# Patient Record
Sex: FEMALE | Race: BLACK OR AFRICAN AMERICAN | NOT HISPANIC OR LATINO
[De-identification: names, ages, dates, MRNs, and addresses within clinical notes are randomized per-mention and may not be internally consistent; named-entity substitution may affect disease eponyms.]

---

## 2018-08-15 PROBLEM — Z00.00 ENCOUNTER FOR PREVENTIVE HEALTH EXAMINATION: Status: ACTIVE | Noted: 2018-08-15

## 2018-09-05 ENCOUNTER — FORM ENCOUNTER (OUTPATIENT)
Age: 48
End: 2018-09-05

## 2018-09-06 ENCOUNTER — APPOINTMENT (OUTPATIENT)
Dept: ORTHOPEDIC SURGERY | Facility: CLINIC | Age: 48
End: 2018-09-06
Payer: MEDICAID

## 2018-09-06 ENCOUNTER — OUTPATIENT (OUTPATIENT)
Dept: OUTPATIENT SERVICES | Facility: HOSPITAL | Age: 48
LOS: 1 days | End: 2018-09-06
Payer: COMMERCIAL

## 2018-09-06 VITALS — BODY MASS INDEX: 41.78 KG/M2 | HEIGHT: 66 IN | WEIGHT: 260 LBS

## 2018-09-06 DIAGNOSIS — Z86.79 PERSONAL HISTORY OF OTHER DISEASES OF THE CIRCULATORY SYSTEM: ICD-10-CM

## 2018-09-06 DIAGNOSIS — Z82.49 FAMILY HISTORY OF ISCHEMIC HEART DISEASE AND OTHER DISEASES OF THE CIRCULATORY SYSTEM: ICD-10-CM

## 2018-09-06 DIAGNOSIS — Z87.39 PERSONAL HISTORY OF OTHER DISEASES OF THE MUSCULOSKELETAL SYSTEM AND CONNECTIVE TISSUE: ICD-10-CM

## 2018-09-06 DIAGNOSIS — M16.0 BILATERAL PRIMARY OSTEOARTHRITIS OF HIP: ICD-10-CM

## 2018-09-06 DIAGNOSIS — Z86.59 PERSONAL HISTORY OF OTHER MENTAL AND BEHAVIORAL DISORDERS: ICD-10-CM

## 2018-09-06 PROCEDURE — 99204 OFFICE O/P NEW MOD 45 MIN: CPT

## 2018-09-06 PROCEDURE — 73521 X-RAY EXAM HIPS BI 2 VIEWS: CPT | Mod: 26

## 2018-09-06 PROCEDURE — 72020 X-RAY EXAM OF SPINE 1 VIEW: CPT | Mod: 26

## 2018-09-06 PROCEDURE — 73521 X-RAY EXAM HIPS BI 2 VIEWS: CPT

## 2018-09-06 PROCEDURE — 72020 X-RAY EXAM OF SPINE 1 VIEW: CPT

## 2018-09-06 RX ORDER — LOSARTAN POTASSIUM 100 MG/1
TABLET, FILM COATED ORAL
Refills: 0 | Status: ACTIVE | COMMUNITY

## 2018-09-06 RX ORDER — CYCLOBENZAPRINE HYDROCHLORIDE 7.5 MG/1
TABLET, FILM COATED ORAL
Refills: 0 | Status: ACTIVE | COMMUNITY

## 2018-09-06 RX ORDER — CHROMIUM 200 MCG
TABLET ORAL
Refills: 0 | Status: ACTIVE | COMMUNITY

## 2018-09-06 RX ORDER — PNV NO.95/FERROUS FUM/FOLIC AC 28MG-0.8MG
TABLET ORAL
Refills: 0 | Status: ACTIVE | COMMUNITY

## 2019-01-29 ENCOUNTER — APPOINTMENT (OUTPATIENT)
Dept: ORTHOPEDIC SURGERY | Facility: CLINIC | Age: 49
End: 2019-01-29
Payer: MEDICAID

## 2019-01-29 VITALS — HEART RATE: 75 BPM | OXYGEN SATURATION: 98 % | HEIGHT: 66 IN | WEIGHT: 243 LBS | BODY MASS INDEX: 39.05 KG/M2

## 2019-01-29 DIAGNOSIS — E66.01 MORBID (SEVERE) OBESITY DUE TO EXCESS CALORIES: ICD-10-CM

## 2019-01-29 DIAGNOSIS — F11.90 OPIOID USE, UNSPECIFIED, UNCOMPLICATED: ICD-10-CM

## 2019-01-29 PROCEDURE — 99214 OFFICE O/P EST MOD 30 MIN: CPT

## 2019-01-29 NOTE — PHYSICAL EXAM
[de-identified] : Constitutional: Well appearing. No acute distress.\par Mental Status: Alert & oriented to person, place and time. Normal affect.\par Pulmonary: No respiratory distress. Normal chest excursion.\par \par Gait: Coxalgic.\par Ambulatory assist devices: Cane.\par \par Cervical spine: Skin intact. No visible deformity. Painless active ROM without evident restriction.\par Bilateral upper extremities: Skin intact. No deformity. Painless active ROM without evident restriction.\par Thoracolumbar spine: No deformity. No tenderness. \par Pelvis: No pelvic obliquity. No tenderness.\par Leg lengths: Equal.\par \par \par Left Hip:\par Skin intact.\par No surgical scars.\par No erythema.\par No ecchymosis.\par No swelling.\par No deformity.\par No focal tenderness.\par Painless and unrestricted range of motion. ROM from full extension to 40 degrees of flexion. <5 degrees of internal rotation. <5 degrees of external rotation. 30 degrees of abduction. 30 degrees of adduction.\par No crepitation.\par No instability.\par FANNIE painless.\par +Impingement (FADIR).\par +Stinchfield\par Abductor power 3/5.\par Flexor power 2+/5.\par \par Bilateral Knees: Skin intact. No surgical scars. No erythema or ecchymosis. No swelling or effusion. No deformity. No focal tenderness. Painless and unrestricted range of motion. Central patellar tracking. No crepitation. No instability. Quadriceps power 5/5.\par \par Neurological: Intact distal crude touch sensation. Normal distal motor power. \par Cardiovascular: Palpable dorsalis pedis and posterior tibialis pulses. Brisk capillary refill. No peripheral edema.\par Lymphatics: No peripheral adenopathy appreciated. [de-identified] : X-ray imaging of the bilateral hips done in September 2018 reviewed, showing severe osteoarthritis of bilateral hips

## 2019-01-29 NOTE — ADDENDUM
[FreeTextEntry1] : This note was written by Winsome Nascimento on 01/29/2019 acting as scribe for Dr. Gleason and REYNALDO Ramirez.\par \par \par \par

## 2019-01-29 NOTE — DISCUSSION/SUMMARY
[de-identified] : Patient has progressively severe hip pain and disability secondary to DJD and has failed extensive conservative care including activity modification, analgesic medications and therapeutic exercise. The patient was indicated for left total hip replacemen\par t.\par We discussed the details of the procedure, the expected recovery period, and the expected outcome. Bearing surface and fixation options were discussed, along with surgical approaches. For this patient I recommended a posterior approach to ensure adequate surgical exposure.\par \par We discussed the likelihood of satisfaction after complete recovery, and the potential causes of dissatisfaction. Specific risks of total hip replacement were discussed in detail. We discussed the risk of surgical site complications including but not limited to: surgical site infection, wound healing complications, bone fracture, prosthetic hip dislocation, neurovascular injury, hemorrhage, limb length inequality, persistent pain and need for reoperation. We discussed surgical blood loss and the possible need for blood transfusion. We discussed the risk of perioperative medical complications, including but not limited to catheter-associated urinary tract infection, venous thromboembolism and other cardiopulmonary complications. We discussed anesthetic options and the risk of anesthesia-related complications. We discussed the durability of prosthetic hips and limitations related to wear, osteolysis and loosening. The patient was previously given a copy of my preoperative packet with additional information about the procedure which she has already read, I encouraged her to review it again.\par \par The patient gave informed consent for the surgical procedure and was instructed to speak to my surgical coordinator to arrange the logistics of surgical scheduling, presurgical testing, and medical optimization and clearance. \par \par I informed the patient that due to her weight, she will be taking on risk greater than the average risk by undergoing surgery. I advised the patient to engage in low impact exercise and to lose weight before surgery, but encouraged her to schedule surgery on a realistic timeline so her condition does not become more severe. I suggested she wait about 3 months while attempting to further improve her weight and cardiovascular health. Patient asked if she could do water aerobics and I informed her that she can. \par \par We also discussed that patient will want to undergo hip replacement surgery on the right hip as well but should decide on timing based on the rate of her recovery from left hip replacement surgery. Follow up with symptoms or health concerns prior to surgery.

## 2019-01-29 NOTE — HISTORY OF PRESENT ILLNESS
[de-identified] : 47 y/o F presents today for surgical consult of bilateral hip pain, pain in left > right. She reports marked pain in the bilateral hips that seriously limits her activity. The pain is exacerbated by activity and makes her unable to place shoes on either foot without assistance. Patient can walk indoors only and uses stairs with difficulty. She has a moderate to severe limp and uses a cane to ambulate most of the time. At her last visit, patient was told to lost weight and she reports that she has lost 20-25lbs. She had been taking Percocet and per my recommendation, patient now takes Tylenol #3 for pain. Patient denies PMHx of diabetes and smoking.

## 2019-03-03 ENCOUNTER — TRANSCRIPTION ENCOUNTER (OUTPATIENT)
Age: 49
End: 2019-03-03

## 2019-03-31 ENCOUNTER — FORM ENCOUNTER (OUTPATIENT)
Age: 49
End: 2019-03-31

## 2019-04-01 ENCOUNTER — OUTPATIENT (OUTPATIENT)
Dept: OUTPATIENT SERVICES | Facility: HOSPITAL | Age: 49
LOS: 1 days | End: 2019-04-01
Payer: COMMERCIAL

## 2019-04-01 ENCOUNTER — APPOINTMENT (OUTPATIENT)
Dept: CT IMAGING | Facility: HOSPITAL | Age: 49
End: 2019-04-01
Payer: MEDICAID

## 2019-04-01 DIAGNOSIS — Z01.818 ENCOUNTER FOR OTHER PREPROCEDURAL EXAMINATION: ICD-10-CM

## 2019-04-01 DIAGNOSIS — Z22.321 CARRIER OR SUSPECTED CARRIER OF METHICILLIN SUSCEPTIBLE STAPHYLOCOCCUS AUREUS: ICD-10-CM

## 2019-04-01 LAB
ANION GAP SERPL CALC-SCNC: 13 MMOL/L — SIGNIFICANT CHANGE UP (ref 5–17)
APPEARANCE UR: CLEAR — SIGNIFICANT CHANGE UP
APTT BLD: 37.3 SEC — HIGH (ref 27.5–36.3)
BACTERIA # UR AUTO: PRESENT /HPF
BILIRUB UR-MCNC: NEGATIVE — SIGNIFICANT CHANGE UP
BUN SERPL-MCNC: 18 MG/DL — SIGNIFICANT CHANGE UP (ref 7–23)
CALCIUM SERPL-MCNC: 9.7 MG/DL — SIGNIFICANT CHANGE UP (ref 8.4–10.5)
CHLORIDE SERPL-SCNC: 101 MMOL/L — SIGNIFICANT CHANGE UP (ref 96–108)
CO2 SERPL-SCNC: 27 MMOL/L — SIGNIFICANT CHANGE UP (ref 22–31)
COLOR SPEC: YELLOW — SIGNIFICANT CHANGE UP
CREAT SERPL-MCNC: 1.11 MG/DL — SIGNIFICANT CHANGE UP (ref 0.5–1.3)
DIFF PNL FLD: NEGATIVE — SIGNIFICANT CHANGE UP
EPI CELLS # UR: ABNORMAL /HPF (ref 0–5)
GLUCOSE SERPL-MCNC: 82 MG/DL — SIGNIFICANT CHANGE UP (ref 70–99)
GLUCOSE UR QL: NEGATIVE — SIGNIFICANT CHANGE UP
HBA1C BLD-MCNC: 4.8 % — SIGNIFICANT CHANGE UP (ref 4–5.6)
HCT VFR BLD CALC: 34.8 % — SIGNIFICANT CHANGE UP (ref 34.5–45)
HGB BLD-MCNC: 10.6 G/DL — LOW (ref 11.5–15.5)
INR BLD: 1.25 — HIGH (ref 0.88–1.16)
KETONES UR-MCNC: ABNORMAL MG/DL
LEUKOCYTE ESTERASE UR-ACNC: ABNORMAL
MCHC RBC-ENTMCNC: 30.5 GM/DL — LOW (ref 32–36)
MCHC RBC-ENTMCNC: 31.1 PG — SIGNIFICANT CHANGE UP (ref 27–34)
MCV RBC AUTO: 102.1 FL — HIGH (ref 80–100)
MRSA PCR RESULT.: NEGATIVE — SIGNIFICANT CHANGE UP
NITRITE UR-MCNC: NEGATIVE — SIGNIFICANT CHANGE UP
NRBC # BLD: 0 /100 WBCS — SIGNIFICANT CHANGE UP (ref 0–0)
PH UR: 5.5 — SIGNIFICANT CHANGE UP (ref 5–8)
PLATELET # BLD AUTO: 283 K/UL — SIGNIFICANT CHANGE UP (ref 150–400)
POTASSIUM SERPL-MCNC: 3.9 MMOL/L — SIGNIFICANT CHANGE UP (ref 3.5–5.3)
POTASSIUM SERPL-SCNC: 3.9 MMOL/L — SIGNIFICANT CHANGE UP (ref 3.5–5.3)
PROT UR-MCNC: NEGATIVE MG/DL — SIGNIFICANT CHANGE UP
PROTHROM AB SERPL-ACNC: 14.2 SEC — HIGH (ref 10–12.9)
RBC # BLD: 3.41 M/UL — LOW (ref 3.8–5.2)
RBC # FLD: 12.2 % — SIGNIFICANT CHANGE UP (ref 10.3–14.5)
RBC CASTS # UR COMP ASSIST: < 5 /HPF — SIGNIFICANT CHANGE UP
S AUREUS DNA NOSE QL NAA+PROBE: NEGATIVE — SIGNIFICANT CHANGE UP
SODIUM SERPL-SCNC: 141 MMOL/L — SIGNIFICANT CHANGE UP (ref 135–145)
SP GR SPEC: 1.02 — SIGNIFICANT CHANGE UP (ref 1–1.03)
UROBILINOGEN FLD QL: 0.2 E.U./DL — SIGNIFICANT CHANGE UP
WBC # BLD: 8.57 K/UL — SIGNIFICANT CHANGE UP (ref 3.8–10.5)
WBC # FLD AUTO: 8.57 K/UL — SIGNIFICANT CHANGE UP (ref 3.8–10.5)
WBC UR QL: ABNORMAL /HPF

## 2019-04-01 PROCEDURE — 93005 ELECTROCARDIOGRAM TRACING: CPT

## 2019-04-01 PROCEDURE — 87641 MR-STAPH DNA AMP PROBE: CPT

## 2019-04-01 PROCEDURE — 83036 HEMOGLOBIN GLYCOSYLATED A1C: CPT

## 2019-04-01 PROCEDURE — 85610 PROTHROMBIN TIME: CPT

## 2019-04-01 PROCEDURE — 93010 ELECTROCARDIOGRAM REPORT: CPT

## 2019-04-01 PROCEDURE — 73700 CT LOWER EXTREMITY W/O DYE: CPT | Mod: 26,LT

## 2019-04-01 PROCEDURE — 87184 SC STD DISK METHOD PER PLATE: CPT

## 2019-04-01 PROCEDURE — 73700 CT LOWER EXTREMITY W/O DYE: CPT

## 2019-04-01 PROCEDURE — 85730 THROMBOPLASTIN TIME PARTIAL: CPT

## 2019-04-01 PROCEDURE — 81001 URINALYSIS AUTO W/SCOPE: CPT

## 2019-04-01 PROCEDURE — 71046 X-RAY EXAM CHEST 2 VIEWS: CPT | Mod: 26

## 2019-04-01 PROCEDURE — 85027 COMPLETE CBC AUTOMATED: CPT

## 2019-04-01 PROCEDURE — 87086 URINE CULTURE/COLONY COUNT: CPT

## 2019-04-01 PROCEDURE — 80048 BASIC METABOLIC PNL TOTAL CA: CPT

## 2019-04-01 PROCEDURE — 71046 X-RAY EXAM CHEST 2 VIEWS: CPT

## 2019-04-04 LAB
-  AMPICILLIN: SIGNIFICANT CHANGE UP
-  CLINDAMYCIN: SIGNIFICANT CHANGE UP
-  ERYTHROMYCIN: SIGNIFICANT CHANGE UP
-  LEVOFLOXACIN: SIGNIFICANT CHANGE UP
-  PENICILLIN: SIGNIFICANT CHANGE UP
-  VANCOMYCIN: SIGNIFICANT CHANGE UP
CULTURE RESULTS: SIGNIFICANT CHANGE UP
METHOD TYPE: SIGNIFICANT CHANGE UP
ORGANISM # SPEC MICROSCOPIC CNT: SIGNIFICANT CHANGE UP
ORGANISM # SPEC MICROSCOPIC CNT: SIGNIFICANT CHANGE UP
SPECIMEN SOURCE: SIGNIFICANT CHANGE UP

## 2019-04-08 LAB
ANABASINE UR-MCNC: <2 NG/ML — SIGNIFICANT CHANGE UP
COTININE UR-MCNC: SIGNIFICANT CHANGE UP NG/ML
NICOTINE UR-MCNC: <5 NG/ML — SIGNIFICANT CHANGE UP
NORNICOTINE UR-MCNC: <2 NG/ML — SIGNIFICANT CHANGE UP

## 2019-04-24 ENCOUNTER — APPOINTMENT (OUTPATIENT)
Dept: INTERNAL MEDICINE | Facility: CLINIC | Age: 49
End: 2019-04-24
Payer: MEDICAID

## 2019-04-24 VITALS
DIASTOLIC BLOOD PRESSURE: 84 MMHG | OXYGEN SATURATION: 98 % | HEART RATE: 70 BPM | HEIGHT: 66 IN | BODY MASS INDEX: 39.05 KG/M2 | TEMPERATURE: 98.3 F | WEIGHT: 243 LBS | RESPIRATION RATE: 14 BRPM | SYSTOLIC BLOOD PRESSURE: 128 MMHG

## 2019-04-24 PROCEDURE — 99204 OFFICE O/P NEW MOD 45 MIN: CPT

## 2019-04-24 NOTE — PLAN
[FreeTextEntry1] : Stop Xarelto 3d prior to surgery- Rec starting 8hrs post surgery\par Advised patient not to mix NSAIDs (diclofenac) in general while on xarelto\par Anemia on iron, monitor h/h closely post op\par

## 2019-04-24 NOTE — HISTORY OF PRESENT ILLNESS
[Atrial Fibrillation] : atrial fibrillation [No Pertinent Pulmonary History] : no history of asthma, COPD, sleep apnea, or smoking [No Adverse Anesthesia Reaction] : no adverse anesthesia reaction in self or family member [Coronary Artery Disease] : no coronary artery disease [FreeTextEntry1] : Left THR [FreeTextEntry3] : Dr Gleason [FreeTextEntry4] : Lifelong issue with hips.  Starting with left THR then right in the future.\par AF on xarelto, dx'd 2 years ago.  \par HTN- on losartan/hctz 50/12.5 in the evening\par Anemia - post menopausal 4 years now, long hx of mild anemia \par Saw her cardiologist 2 days ago. \par UTI- recently treated by PMD.

## 2019-04-24 NOTE — PHYSICAL EXAM
[No Acute Distress] : no acute distress [Well Nourished] : well nourished [Well Developed] : well developed [Supple] : supple [No Respiratory Distress] : no respiratory distress  [No Accessory Muscle Use] : no accessory muscle use [Clear to Auscultation] : lungs were clear to auscultation bilaterally [Normal Rate] : normal rate  [Normal S1, S2] : normal S1 and S2 [Regular Rhythm] : with a regular rhythm [Normal Affect] : the affect was normal [No Rash] : no rash [Normal Insight/Judgement] : insight and judgment were intact

## 2019-04-26 NOTE — PATIENT PROFILE ADULT - NSPROEDALEARNPREF_GEN_A_NUR
verbal instruction/written material written material/skill demonstration/verbal instruction/individual instruction

## 2019-04-29 ENCOUNTER — APPOINTMENT (OUTPATIENT)
Dept: ORTHOPEDIC SURGERY | Facility: HOSPITAL | Age: 49
End: 2019-04-29

## 2019-04-30 PROBLEM — I10 ESSENTIAL (PRIMARY) HYPERTENSION: Chronic | Status: ACTIVE | Noted: 2019-04-26

## 2019-04-30 PROBLEM — M19.90 UNSPECIFIED OSTEOARTHRITIS, UNSPECIFIED SITE: Chronic | Status: ACTIVE | Noted: 2019-04-26

## 2019-04-30 PROBLEM — M51.26 OTHER INTERVERTEBRAL DISC DISPLACEMENT, LUMBAR REGION: Chronic | Status: ACTIVE | Noted: 2019-04-26

## 2019-04-30 PROBLEM — I48.91 UNSPECIFIED ATRIAL FIBRILLATION: Chronic | Status: ACTIVE | Noted: 2019-04-26

## 2019-04-30 PROBLEM — F32.9 MAJOR DEPRESSIVE DISORDER, SINGLE EPISODE, UNSPECIFIED: Chronic | Status: ACTIVE | Noted: 2019-04-26

## 2019-05-07 NOTE — H&P ADULT - NSICDXPASTMEDICALHX_GEN_ALL_CORE_FT
PAST MEDICAL HISTORY:  Afib     Depression     Herniated intervertebral disc of lumbar spine     HTN (hypertension)     Osteoarthritis bilateral hips

## 2019-05-07 NOTE — H&P ADULT - HISTORY OF PRESENT ILLNESS
50yo m c/o left hip pain x   Presents today for elective LEFT THR, ROBOTIC ASSISTED. 49F c/o left hip pain x chronic. Pt denies preceding trauma/injury. Pt states her hip pain is localized; she takes Tylenol as needed for pain control. She has failed conservative treatment for her symptoms. Pt endorses sciatic pain/associated tingling of left lower extremity; + weakness with ambulation. Pt ambulates with a cane x 2 years. Pt denies DVT hx; denies CP, SOB, N/V, tactile fevers. Pt takes Xarelto for afib which she d/c 3 days pre operatively. Pt treated with 3 days of abx regimen for UTI pre op - patient denies urinary symptoms.     Presents today for elective LEFT THR, ROBOTIC ASSISTED.

## 2019-05-07 NOTE — H&P ADULT - NSHPPHYSICALEXAM_GEN_ALL_CORE
PE:   Decreased ROM secondary to pain. Rest of PE per medical clearance. Decreased ROM secondary to pain, left hip.  Skin warm and well perfused, no visible wounds/erythema/ecchymoses  EHL/FHL/TA/GS 5/5 motor strength bilaterally   SLT in tact and equal to distal bilateral lower extremities   DP/PT pulses 2+  Remainder of PE per medical clearance.

## 2019-05-07 NOTE — H&P ADULT - NSHPLABSRESULTS_GEN_ALL_CORE
preop cbc/bmp/coags wnl per medical clearance   ua + pt. was recently treated per medical clearance. repeat ua dos   EKG- wnl per medical clearance   CXR wnl per medical clearance   nares negative preop cbc/bmp/coags wnl per medical clearance   ua + pt. was treated with full abx regimen outpatient   EKG- wnl per medical clearance   CXR wnl per medical clearance   nares negative

## 2019-05-08 ENCOUNTER — RESULT REVIEW (OUTPATIENT)
Age: 49
End: 2019-05-08

## 2019-05-08 ENCOUNTER — APPOINTMENT (OUTPATIENT)
Dept: ORTHOPEDIC SURGERY | Facility: HOSPITAL | Age: 49
End: 2019-05-08

## 2019-05-08 ENCOUNTER — MEDICATION RENEWAL (OUTPATIENT)
Age: 49
End: 2019-05-08

## 2019-05-08 ENCOUNTER — INPATIENT (INPATIENT)
Facility: HOSPITAL | Age: 49
LOS: 3 days | Discharge: HOME HEALTH | DRG: 470 | End: 2019-05-12
Attending: ORTHOPAEDIC SURGERY | Admitting: ORTHOPAEDIC SURGERY
Payer: COMMERCIAL

## 2019-05-08 ENCOUNTER — TRANSCRIPTION ENCOUNTER (OUTPATIENT)
Age: 49
End: 2019-05-08

## 2019-05-08 VITALS
HEIGHT: 66 IN | TEMPERATURE: 99 F | DIASTOLIC BLOOD PRESSURE: 90 MMHG | WEIGHT: 246.48 LBS | RESPIRATION RATE: 18 BRPM | HEART RATE: 75 BPM | SYSTOLIC BLOOD PRESSURE: 144 MMHG

## 2019-05-08 DIAGNOSIS — F32.9 MAJOR DEPRESSIVE DISORDER, SINGLE EPISODE, UNSPECIFIED: ICD-10-CM

## 2019-05-08 DIAGNOSIS — M19.90 UNSPECIFIED OSTEOARTHRITIS, UNSPECIFIED SITE: ICD-10-CM

## 2019-05-08 DIAGNOSIS — I10 ESSENTIAL (PRIMARY) HYPERTENSION: ICD-10-CM

## 2019-05-08 DIAGNOSIS — M51.26 OTHER INTERVERTEBRAL DISC DISPLACEMENT, LUMBAR REGION: ICD-10-CM

## 2019-05-08 DIAGNOSIS — I48.91 UNSPECIFIED ATRIAL FIBRILLATION: ICD-10-CM

## 2019-05-08 LAB
APPEARANCE UR: CLEAR — SIGNIFICANT CHANGE UP
BASE EXCESS BLDA CALC-SCNC: 1 MMOL/L — SIGNIFICANT CHANGE UP (ref -2–3)
BILIRUB UR-MCNC: NEGATIVE — SIGNIFICANT CHANGE UP
CA-I BLDA-SCNC: 1.14 MMOL/L — SIGNIFICANT CHANGE UP (ref 1.12–1.3)
COHGB MFR BLDA: 0.3 % — SIGNIFICANT CHANGE UP
COLOR SPEC: YELLOW — SIGNIFICANT CHANGE UP
DIFF PNL FLD: NEGATIVE — SIGNIFICANT CHANGE UP
GLUCOSE BLDC GLUCOMTR-MCNC: 111 MG/DL — HIGH (ref 70–99)
GLUCOSE BLDC GLUCOMTR-MCNC: 88 MG/DL — SIGNIFICANT CHANGE UP (ref 70–99)
GLUCOSE UR QL: NEGATIVE — SIGNIFICANT CHANGE UP
HCO3 BLDA-SCNC: 29 MMOL/L — HIGH (ref 21–28)
HGB BLDA-MCNC: 9.4 G/DL — LOW (ref 11.5–15.5)
INR BLD: 1.09 — SIGNIFICANT CHANGE UP (ref 0.88–1.16)
KETONES UR-MCNC: NEGATIVE — SIGNIFICANT CHANGE UP
LEUKOCYTE ESTERASE UR-ACNC: NEGATIVE — SIGNIFICANT CHANGE UP
METHGB MFR BLDA: 0.1 % — SIGNIFICANT CHANGE UP
NITRITE UR-MCNC: NEGATIVE — SIGNIFICANT CHANGE UP
O2 CT VFR BLDA CALC: SIGNIFICANT CHANGE UP (ref 15–23)
OXYHGB MFR BLDA: 95 % — SIGNIFICANT CHANGE UP (ref 94–100)
PCO2 BLDA: 66 MMHG — CRITICAL HIGH (ref 32–45)
PH BLDA: 7.26 — LOW (ref 7.35–7.45)
PH UR: 6 — SIGNIFICANT CHANGE UP (ref 5–8)
PO2 BLDA: 88 MMHG — SIGNIFICANT CHANGE UP (ref 83–108)
POTASSIUM BLDA-SCNC: 3.6 MMOL/L — SIGNIFICANT CHANGE UP (ref 3.5–4.9)
PROT UR-MCNC: NEGATIVE MG/DL — SIGNIFICANT CHANGE UP
PROTHROM AB SERPL-ACNC: 12.3 SEC — SIGNIFICANT CHANGE UP (ref 10–12.9)
SAO2 % BLDA: 95 % — SIGNIFICANT CHANGE UP (ref 95–100)
SODIUM BLDA-SCNC: 140 MMOL/L — SIGNIFICANT CHANGE UP (ref 138–146)
SP GR SPEC: <=1.005 — SIGNIFICANT CHANGE UP (ref 1–1.03)
UROBILINOGEN FLD QL: 0.2 E.U./DL — SIGNIFICANT CHANGE UP

## 2019-05-08 PROCEDURE — 72170 X-RAY EXAM OF PELVIS: CPT | Mod: 26

## 2019-05-08 PROCEDURE — 97607 NEG PRS WND THR NDME<=50SQCM: CPT

## 2019-05-08 PROCEDURE — 20985 CPTR-ASST DIR MS PX: CPT

## 2019-05-08 PROCEDURE — 27130 TOTAL HIP ARTHROPLASTY: CPT | Mod: LT

## 2019-05-08 RX ORDER — APREPITANT 80 MG/1
40 CAPSULE ORAL ONCE
Qty: 0 | Refills: 0 | Status: COMPLETED | OUTPATIENT
Start: 2019-05-08 | End: 2019-05-08

## 2019-05-08 RX ORDER — MORPHINE SULFATE 50 MG/1
2 CAPSULE, EXTENDED RELEASE ORAL
Qty: 0 | Refills: 0 | Status: DISCONTINUED | OUTPATIENT
Start: 2019-05-08 | End: 2019-05-10

## 2019-05-08 RX ORDER — CELECOXIB 200 MG/1
200 CAPSULE ORAL
Qty: 0 | Refills: 0 | Status: DISCONTINUED | OUTPATIENT
Start: 2019-05-08 | End: 2019-05-12

## 2019-05-08 RX ORDER — LOSARTAN POTASSIUM 100 MG/1
50 TABLET, FILM COATED ORAL DAILY
Qty: 0 | Refills: 0 | Status: DISCONTINUED | OUTPATIENT
Start: 2019-05-08 | End: 2019-05-09

## 2019-05-08 RX ORDER — DOCUSATE SODIUM 100 MG
100 CAPSULE ORAL THREE TIMES A DAY
Qty: 0 | Refills: 0 | Status: DISCONTINUED | OUTPATIENT
Start: 2019-05-08 | End: 2019-05-12

## 2019-05-08 RX ORDER — MORPHINE SULFATE 50 MG/1
2 CAPSULE, EXTENDED RELEASE ORAL
Qty: 0 | Refills: 0 | Status: DISCONTINUED | OUTPATIENT
Start: 2019-05-08 | End: 2019-05-12

## 2019-05-08 RX ORDER — ALBUMIN HUMAN 25 %
500 VIAL (ML) INTRAVENOUS ONCE
Qty: 0 | Refills: 0 | Status: DISCONTINUED | OUTPATIENT
Start: 2019-05-08 | End: 2019-05-10

## 2019-05-08 RX ORDER — CELECOXIB 200 MG/1
400 CAPSULE ORAL ONCE
Qty: 0 | Refills: 0 | Status: COMPLETED | OUTPATIENT
Start: 2019-05-08 | End: 2019-05-08

## 2019-05-08 RX ORDER — BUPIVACAINE 13.3 MG/ML
20 INJECTION, SUSPENSION, LIPOSOMAL INFILTRATION ONCE
Qty: 0 | Refills: 0 | Status: DISCONTINUED | OUTPATIENT
Start: 2019-05-08 | End: 2019-05-12

## 2019-05-08 RX ORDER — SENNA PLUS 8.6 MG/1
2 TABLET ORAL AT BEDTIME
Qty: 0 | Refills: 0 | Status: DISCONTINUED | OUTPATIENT
Start: 2019-05-08 | End: 2019-05-12

## 2019-05-08 RX ORDER — POLYETHYLENE GLYCOL 3350 17 G/17G
17 POWDER, FOR SOLUTION ORAL DAILY
Qty: 0 | Refills: 0 | Status: DISCONTINUED | OUTPATIENT
Start: 2019-05-08 | End: 2019-05-12

## 2019-05-08 RX ORDER — SODIUM CHLORIDE 9 MG/ML
1000 INJECTION, SOLUTION INTRAVENOUS
Qty: 0 | Refills: 0 | Status: DISCONTINUED | OUTPATIENT
Start: 2019-05-08 | End: 2019-05-10

## 2019-05-08 RX ORDER — KETOROLAC TROMETHAMINE 30 MG/ML
15 SYRINGE (ML) INJECTION EVERY 6 HOURS
Qty: 0 | Refills: 0 | Status: DISCONTINUED | OUTPATIENT
Start: 2019-05-08 | End: 2019-05-10

## 2019-05-08 RX ORDER — MAGNESIUM HYDROXIDE 400 MG/1
30 TABLET, CHEWABLE ORAL DAILY
Qty: 0 | Refills: 0 | Status: DISCONTINUED | OUTPATIENT
Start: 2019-05-08 | End: 2019-05-12

## 2019-05-08 RX ORDER — METOPROLOL TARTRATE 50 MG
5 TABLET ORAL ONCE
Qty: 0 | Refills: 0 | Status: COMPLETED | OUTPATIENT
Start: 2019-05-08 | End: 2019-05-08

## 2019-05-08 RX ORDER — ACETAMINOPHEN 500 MG
1000 TABLET ORAL ONCE
Qty: 0 | Refills: 0 | Status: COMPLETED | OUTPATIENT
Start: 2019-05-08 | End: 2019-05-08

## 2019-05-08 RX ORDER — ENOXAPARIN SODIUM 100 MG/ML
30 INJECTION SUBCUTANEOUS
Qty: 0 | Refills: 0 | Status: DISCONTINUED | OUTPATIENT
Start: 2019-05-08 | End: 2019-05-10

## 2019-05-08 RX ORDER — HYDROCHLOROTHIAZIDE 25 MG
12.5 TABLET ORAL DAILY
Qty: 0 | Refills: 0 | Status: DISCONTINUED | OUTPATIENT
Start: 2019-05-08 | End: 2019-05-09

## 2019-05-08 RX ORDER — PANTOPRAZOLE SODIUM 20 MG/1
40 TABLET, DELAYED RELEASE ORAL
Qty: 0 | Refills: 0 | Status: DISCONTINUED | OUTPATIENT
Start: 2019-05-08 | End: 2019-05-12

## 2019-05-08 RX ORDER — CEFAZOLIN SODIUM 1 G
2000 VIAL (EA) INJECTION EVERY 8 HOURS
Qty: 0 | Refills: 0 | Status: COMPLETED | OUTPATIENT
Start: 2019-05-08 | End: 2019-05-09

## 2019-05-08 RX ORDER — OXYCODONE HYDROCHLORIDE 5 MG/1
10 TABLET ORAL EVERY 4 HOURS
Qty: 0 | Refills: 0 | Status: DISCONTINUED | OUTPATIENT
Start: 2019-05-08 | End: 2019-05-12

## 2019-05-08 RX ORDER — OXYCODONE HYDROCHLORIDE 5 MG/1
5 TABLET ORAL EVERY 4 HOURS
Qty: 0 | Refills: 0 | Status: DISCONTINUED | OUTPATIENT
Start: 2019-05-08 | End: 2019-05-12

## 2019-05-08 RX ORDER — ONDANSETRON 8 MG/1
4 TABLET, FILM COATED ORAL EVERY 6 HOURS
Qty: 0 | Refills: 0 | Status: DISCONTINUED | OUTPATIENT
Start: 2019-05-08 | End: 2019-05-12

## 2019-05-08 RX ORDER — CEFAZOLIN SODIUM 1 G
2000 VIAL (EA) INJECTION EVERY 8 HOURS
Qty: 0 | Refills: 0 | Status: DISCONTINUED | OUTPATIENT
Start: 2019-05-08 | End: 2019-05-08

## 2019-05-08 RX ORDER — MELOXICAM 15 MG/1
1 TABLET ORAL
Qty: 28 | Refills: 0
Start: 2019-05-08 | End: 2019-06-04

## 2019-05-08 RX ORDER — ACETAMINOPHEN 500 MG
650 TABLET ORAL EVERY 6 HOURS
Qty: 0 | Refills: 0 | Status: COMPLETED | OUTPATIENT
Start: 2019-05-08 | End: 2019-05-11

## 2019-05-08 RX ADMIN — Medication 100 MILLIGRAM(S): at 22:11

## 2019-05-08 RX ADMIN — Medication 15 MILLIGRAM(S): at 14:15

## 2019-05-08 RX ADMIN — Medication 650 MILLIGRAM(S): at 23:59

## 2019-05-08 RX ADMIN — Medication 15 MILLIGRAM(S): at 14:00

## 2019-05-08 RX ADMIN — ENOXAPARIN SODIUM 30 MILLIGRAM(S): 100 INJECTION SUBCUTANEOUS at 18:00

## 2019-05-08 RX ADMIN — Medication 15 MILLIGRAM(S): at 23:53

## 2019-05-08 RX ADMIN — Medication 2000 MILLIGRAM(S): at 19:09

## 2019-05-08 RX ADMIN — CELECOXIB 200 MILLIGRAM(S): 200 CAPSULE ORAL at 18:00

## 2019-05-08 RX ADMIN — Medication 5 MILLIGRAM(S): at 05:34

## 2019-05-08 RX ADMIN — APREPITANT 40 MILLIGRAM(S): 80 CAPSULE ORAL at 07:57

## 2019-05-08 RX ADMIN — SENNA PLUS 2 TABLET(S): 8.6 TABLET ORAL at 22:11

## 2019-05-08 RX ADMIN — Medication 1000 MILLIGRAM(S): at 07:57

## 2019-05-08 RX ADMIN — CELECOXIB 200 MILLIGRAM(S): 200 CAPSULE ORAL at 18:55

## 2019-05-08 RX ADMIN — LOSARTAN POTASSIUM 50 MILLIGRAM(S): 100 TABLET, FILM COATED ORAL at 20:09

## 2019-05-08 RX ADMIN — CELECOXIB 400 MILLIGRAM(S): 200 CAPSULE ORAL at 07:57

## 2019-05-08 RX ADMIN — MORPHINE SULFATE 2 MILLIGRAM(S): 50 CAPSULE, EXTENDED RELEASE ORAL at 13:55

## 2019-05-08 RX ADMIN — MORPHINE SULFATE 2 MILLIGRAM(S): 50 CAPSULE, EXTENDED RELEASE ORAL at 13:40

## 2019-05-08 RX ADMIN — Medication 650 MILLIGRAM(S): at 18:00

## 2019-05-08 RX ADMIN — Medication 650 MILLIGRAM(S): at 18:55

## 2019-05-08 NOTE — DISCHARGE NOTE PROVIDER - NSDCFUADDINST_GEN_ALL_CORE_FT
**Please see Dr. Gleason's separate discharge instructions sheet. Your medications were sent to [facility], located on the first floor of [facility]. Take medications as prescribed.**    Weight bear as tolerated with assistive device.  No strenuous activity, heavy lifting, driving or returning to work until cleared by MD.  You may shower - dressing is water-resistant, no soaking in bathtubs.  Remove dressing after post op day 5-7, then leave incision open to air. Keep incision clean and dry.  Try to have regular bowel movements, take stool softener or laxative if necessary.  May take Pepcid or Zantac for upset stomach.  May take Aleve or Naproxen instead of Meloxicam/Celebrex.  Swelling may travel all the way down leg to foot, this is normal and will subside in a few weeks.  Call to schedule an appt with Dr. Gleason for follow up, if you have staples or sutures they will be removed in office.  Contact your doctor if you experience: fever greater than 101.5, chills, chest pain, difficulty breathing, redness or excessive drainage around the incision, other concerns.  Follow up with your primary care provider. **Please see Dr. Gleason's separate discharge instructions sheet. Your medications were sent to [facility], located on the first floor of [facility]. Take medications as prescribed.**    Weight bear as tolerated with assistive device. Follow posterior hip precautions. Do not cross your legs or let your hips go below your knees.    No strenuous activity, heavy lifting, driving or returning to work until cleared by MD.  You may shower - dressing is water-resistant, no soaking in bathtubs.  Remove dressing after post op day 5-7, then leave incision open to air. Keep incision clean and dry.    Try to have regular bowel movements, take stool softener or laxative if necessary.    May take Pepcid or Zantac for upset stomach.  May take Aleve or Naproxen instead of Meloxicam/Celebrex.  Swelling may travel all the way down leg to foot, this is normal and will subside in a few weeks.    Call to schedule an appt with Dr. Gleason for follow up, if you have staples or sutures they will be removed in office.  Contact your doctor if you experience: fever greater than 101.5, chills, chest pain, difficulty breathing, redness or excessive drainage around the incision, other concerns.    Follow up with your primary care provider. **Please see Dr. Gleason's separate discharge instructions sheet. Your medications were sent to [facility], located on the first floor of [facility]. Take medications as prescribed.**    Although you felt well in the hospital, your blood pressures were borderline low, and your blood pressure medication (losartan-hctz) was held. Please take your blood pressure prior to taking your blood pressure medication. If the top number is less than 110 do not take your blood pressure medication. The painkiller percocet that you were prescribed can also cause your blood pressure to be low. Drink plenty of fluids and consume a regular diet. Follow up with your primary care provider.    Weight bear as tolerated with assistive device. Follow posterior hip precautions. Do not cross your legs or let your hips go below your knees.    No strenuous activity, heavy lifting, driving or returning to work until cleared by MD.  You may shower - dressing is water-resistant, no soaking in bathtubs.  Remove dressing after post op day 5-7, then leave incision open to air. Keep incision clean and dry.    Try to have regular bowel movements, take stool softener or laxative if necessary.    May take Pepcid or Zantac for upset stomach.  May take Aleve or Naproxen instead of Meloxicam/Celebrex.  Swelling may travel all the way down leg to foot, this is normal and will subside in a few weeks.    Call to schedule an appt with Dr. Gleason for follow up, if you have staples or sutures they will be removed in office.  Contact your doctor if you experience: fever greater than 101.5, chills, chest pain, difficulty breathing, redness or excessive drainage around the incision, other concerns.    Follow up with your primary care provider. **Please see Dr. Gleason's separate discharge instructions sheet. Your medications were sent to [facility], located on the first floor of [facility]. Take medications as prescribed.**      Although you felt well in the hospital, your blood pressures were borderline low, and your blood pressure medication (losartan-hctz) was held. Please take your blood pressure prior to taking your blood pressure medication. If the top number is less than 110 do not take your blood pressure medication. The painkiller percocet that you were prescribed can also cause your blood pressure to be low. Drink plenty of fluids and consume a regular diet. Follow up with your primary care provider.      You have a prevena wound vac dressing. The battery will die approximately 1 week after your surgery. When this happens, you may remove the dressing and keep the incision open to air. You may shower - dressing is water-resistant, no soaking in bathtubs.    You may shower - dressing is water-resistant, no soaking in bathtubs.  Weight bear as tolerated with assistive device. Follow posterior hip precautions. Do not cross your legs or let your hips go below your knees.  No strenuous activity, heavy lifting, driving or returning to work until cleared by MD.      Try to have regular bowel movements, take stool softener or laxative if necessary.    May take Pepcid or Zantac for upset stomach.  May take Aleve or Naproxen instead of Meloxicam/Celebrex.  Swelling may travel all the way down leg to foot, this is normal and will subside in a few weeks.    Call to schedule an appt with Dr. Gleason for follow up, if you have staples or sutures they will be removed in office.  Contact your doctor if you experience: fever greater than 101.5, chills, chest pain, difficulty breathing, redness or excessive drainage around the incision, other concerns.    Follow up with your primary care provider. **Please see Dr. Gleason's separate discharge instructions sheet. Your medications were sent to [facility], located on the first floor of [facility]. Take medications as prescribed.**      Although you felt well in the hospital, your blood pressures were borderline low, and your blood pressure medication (losartan-hctz) was held. Please take your blood pressure prior to taking your blood pressure medication. If the top number is less than 110 do not take your blood pressure medication. The painkiller percocet that you were prescribed can also cause your blood pressure to be low. Drink plenty of fluids and consume a regular diet. You were also anemic in the hospital. Continue to take your iron supplements and follow up.      You have a prevena wound vac dressing. The battery will die approximately 1 week after your surgery. When this happens, you may remove the dressing and keep the incision open to air. You may shower - dressing is water-resistant, no soaking in bathtubs.    You may shower - dressing is water-resistant, no soaking in bathtubs.  Weight bear as tolerated with assistive device. Follow posterior hip precautions. Do not cross your legs or let your hips go below your knees.  No strenuous activity, heavy lifting, driving or returning to work until cleared by MD.      Try to have regular bowel movements, take stool softener or laxative if necessary.    May take Pepcid or Zantac for upset stomach.  May take Aleve or Naproxen instead of Meloxicam/Celebrex.  Swelling may travel all the way down leg to foot, this is normal and will subside in a few weeks.    Call to schedule an appt with Dr. Gleason for follow up, if you have staples or sutures they will be removed in office.  Contact your doctor if you experience: fever greater than 101.5, chills, chest pain, difficulty breathing, redness or excessive drainage around the incision, other concerns.    Follow up with your primary care provider. **Please see Dr. Gleason's separate discharge instructions sheet. Your medications were sent to [facility], located on the first floor of [facility]. Take medications as prescribed.**      Although you felt well in the hospital, your blood pressures were borderline low, and your blood pressure medication (losartan-hctz) was held. Please take your blood pressure prior to taking your blood pressure medication. If the top number is less than 110 do not take your blood pressure medication.  The painkiller percocet that you were prescribed can also cause your blood pressure to be low. Drink plenty of fluids and consume a regular diet.     You also had a rapid heart beat your 2nd night in the hospital, which resolved spontaneously. You have atrial fibrillation at home, and take the blood thinner xarelto for this condition. It is recommended you follow-up with your cardiologist within a week to assess for the possible need to start a heart rate-controlling medication.    You were also anemic in the hospital. Continue to take your iron supplements and follow up.      You have a prevena wound vac dressing. The battery will die approximately 1 week after your surgery. When this happens, you may remove the dressing and keep the incision open to air. You may shower - dressing is water-resistant, no soaking in bathtubs.    You may shower - dressing is water-resistant, no soaking in bathtubs.  Weight bear as tolerated with assistive device. Follow posterior hip precautions. Do not cross your legs or let your hips go below your knees.  No strenuous activity, heavy lifting, driving or returning to work until cleared by MD.      Try to have regular bowel movements, take stool softener or laxative if necessary.    May take Pepcid or Zantac for upset stomach.  May take Aleve or Naproxen instead of Meloxicam/Celebrex.  Swelling may travel all the way down leg to foot, this is normal and will subside in a few weeks.    Call to schedule an appt with Dr. Gleason for follow up, if you have staples or sutures they will be removed in office.  Contact your doctor if you experience: fever greater than 101.5, chills, chest pain, difficulty breathing, redness or excessive drainage around the incision, other concerns.    Follow up with your primary care provider. **Please see Dr. Gleason's separate discharge instructions sheet. Your medications were sent to [facility], located on the first floor of [facility]. Take medications as prescribed.**      Although you felt well in the hospital, your blood pressures were borderline low, and your blood pressure medication (losartan-hctz) was held. Please take your blood pressure prior to taking your blood pressure medication. If the top number is less than 110 do not take your blood pressure medication.  The painkiller percocet that you were prescribed can also cause your blood pressure to be low. Drink plenty of fluids and consume a regular diet.     You also had a rapid heart beat your 2nd night in the hospital, which resolved spontaneously. You have atrial fibrillation at home, and take the blood thinner xarelto for this condition. It is recommended you follow-up with your cardiologist within a week to assess for the possible need to start a heart rate-controlling medication.    You were also anemic in the hospital. Continue to take your iron supplements and follow up.      You have a prevena wound vac dressing. The battery will die approximately 1 week after your surgery. When this happens, you may remove the dressing and keep the incision open to air.    You may shower - dressing is water-resistant, no soaking in bathtubs.  Weight bear as tolerated with assistive device. Follow posterior hip precautions. Do not cross your legs or let your hips go below your knees.  No strenuous activity, heavy lifting, driving or returning to work until cleared by MD.      Try to have regular bowel movements, take stool softener or laxative if necessary.    May take Pepcid or Zantac for upset stomach.  May take Aleve or Naproxen instead of Meloxicam/Celebrex.  Swelling may travel all the way down leg to foot, this is normal and will subside in a few weeks.    Call to schedule an appt with Dr. Gleason for follow up, if you have staples or sutures they will be removed in office.  Contact your doctor if you experience: fever greater than 101.5, chills, chest pain, difficulty breathing, redness or excessive drainage around the incision, other concerns.    Follow up with your primary care provider.

## 2019-05-08 NOTE — DISCHARGE NOTE PROVIDER - NSDCCPCAREPLAN_GEN_ALL_CORE_FT
PRINCIPAL DISCHARGE DIAGNOSIS  Diagnosis: Osteoarthritis  Assessment and Plan of Treatment: improvement s/p left total hip replacement

## 2019-05-08 NOTE — PHYSICAL THERAPY INITIAL EVALUATION ADULT - CRITERIA FOR SKILLED THERAPEUTIC INTERVENTIONS
impairments found/functional limitations in following categories/anticipated equipment needs at discharge/anticipated discharge recommendation/therapy frequency/rehab potential

## 2019-05-08 NOTE — DISCHARGE NOTE PROVIDER - HOSPITAL COURSE
Admitted 5/8/19    Surgery- left total hip replacement 5/8/19    Desiree-op Antibiotics    Pain control    DVT prophylaxis    OOB/Physical Therapy

## 2019-05-08 NOTE — PHYSICAL THERAPY INITIAL EVALUATION ADULT - GAIT DEVIATIONS NOTED, PT EVAL
decreased pat/decreased velocity of limb motion/decreased step length/decreased stride length/increased time in double stance

## 2019-05-08 NOTE — PHYSICAL THERAPY INITIAL EVALUATION ADULT - PERTINENT HX OF CURRENT PROBLEM, REHAB EVAL
49F c/o left hip pain x chronic. Pt denies preceding trauma/injury. Pt states her hip pain is localized; she takes Tylenol as needed for pain control. She has failed conservative treatment for her symptoms. Pt endorses sciatic pain/associated tingling of left lower extremity; + weakness with ambulation. Pt ambulates with a cane x 2 years.

## 2019-05-08 NOTE — DISCHARGE NOTE PROVIDER - NSDCACTIVITY_GEN_ALL_CORE
Walking - Outdoors allowed/Stairs allowed/Walking - Indoors allowed/Do not drive or operate machinery/Showering allowed

## 2019-05-08 NOTE — PROGRESS NOTE ADULT - SUBJECTIVE AND OBJECTIVE BOX
Orthopedics Post Op Check    Procedure: left SINCERE  Surgeon: Dr Gleason     Pt comfortable, without complaints. Pain well controlled in PACU   Denies CP, SOB, N/V, numbness/tingling     Vital Signs Last 24 Hrs  T(C): 36.4 (08 May 2019 12:55), Max: 37 (08 May 2019 07:22)  T(F): 97.5 (08 May 2019 12:55), Max: 98.6 (08 May 2019 07:22)  HR: 110 (08 May 2019 14:10) (75 - 128)  BP: 117/82 (08 May 2019 14:10) (108/67 - 144/90)  BP(mean): 108 (08 May 2019 13:55) (81 - 108)  RR: 20 (08 May 2019 14:10) (17 - 26)  SpO2: 100% (08 May 2019 14:10) (94% - 100%)  AVSS, NAD      General: Pt Alert and oriented, comfortable  Dressing C/D/I. Prevena in place.  Sensation intact and equal BLE   Motor ehl/ta/gs/fhl 5/5 BLE   Pulses dp palpable BLE, skin warm and well perfused           Post op XR: s/p left SINCERE hardware in place    A/P: 49yFemale POD#0 s/p left SINCERE   - Stable  - Pain Control  - DVT ppx: Lovenox  - Desiree-op abx: Ancef  - PT, WBS: WBAT   - F/U AM Labs

## 2019-05-08 NOTE — DISCHARGE NOTE PROVIDER - CARE PROVIDER_API CALL
Rufino Gleason)  Orthopaedic Surgery  [address], [address] [place] [address]  Phone: [phone]  Fax: [phone]  Follow Up Time: 2 weeks

## 2019-05-09 LAB
ANION GAP SERPL CALC-SCNC: 10 MMOL/L — SIGNIFICANT CHANGE UP (ref 5–17)
BUN SERPL-MCNC: 17 MG/DL — SIGNIFICANT CHANGE UP (ref 7–23)
CALCIUM SERPL-MCNC: 9.2 MG/DL — SIGNIFICANT CHANGE UP (ref 8.4–10.5)
CHLORIDE SERPL-SCNC: 104 MMOL/L — SIGNIFICANT CHANGE UP (ref 96–108)
CO2 SERPL-SCNC: 23 MMOL/L — SIGNIFICANT CHANGE UP (ref 22–31)
CREAT SERPL-MCNC: 0.73 MG/DL — SIGNIFICANT CHANGE UP (ref 0.5–1.3)
GLUCOSE SERPL-MCNC: 112 MG/DL — HIGH (ref 70–99)
HCT VFR BLD CALC: 28.4 % — LOW (ref 34.5–45)
HGB BLD-MCNC: 8.9 G/DL — LOW (ref 11.5–15.5)
MCHC RBC-ENTMCNC: 31.2 PG — SIGNIFICANT CHANGE UP (ref 27–34)
MCHC RBC-ENTMCNC: 31.3 GM/DL — LOW (ref 32–36)
MCV RBC AUTO: 99.6 FL — SIGNIFICANT CHANGE UP (ref 80–100)
NRBC # BLD: 0 /100 WBCS — SIGNIFICANT CHANGE UP (ref 0–0)
PLATELET # BLD AUTO: 208 K/UL — SIGNIFICANT CHANGE UP (ref 150–400)
POTASSIUM SERPL-MCNC: 4.1 MMOL/L — SIGNIFICANT CHANGE UP (ref 3.5–5.3)
POTASSIUM SERPL-SCNC: 4.1 MMOL/L — SIGNIFICANT CHANGE UP (ref 3.5–5.3)
RBC # BLD: 2.85 M/UL — LOW (ref 3.8–5.2)
RBC # FLD: 12.6 % — SIGNIFICANT CHANGE UP (ref 10.3–14.5)
SODIUM SERPL-SCNC: 137 MMOL/L — SIGNIFICANT CHANGE UP (ref 135–145)
WBC # BLD: 13.6 K/UL — HIGH (ref 3.8–10.5)
WBC # FLD AUTO: 13.6 K/UL — HIGH (ref 3.8–10.5)

## 2019-05-09 PROCEDURE — 99253 IP/OBS CNSLTJ NEW/EST LOW 45: CPT

## 2019-05-09 RX ORDER — DOCUSATE SODIUM 100 MG
1 CAPSULE ORAL
Qty: 0 | Refills: 0 | DISCHARGE
Start: 2019-05-09

## 2019-05-09 RX ORDER — SENNA PLUS 8.6 MG/1
2 TABLET ORAL
Qty: 0 | Refills: 0 | DISCHARGE
Start: 2019-05-09

## 2019-05-09 RX ORDER — POLYETHYLENE GLYCOL 3350 17 G/17G
17 POWDER, FOR SOLUTION ORAL
Qty: 0 | Refills: 0 | DISCHARGE
Start: 2019-05-09

## 2019-05-09 RX ORDER — SODIUM CHLORIDE 9 MG/ML
500 INJECTION INTRAMUSCULAR; INTRAVENOUS; SUBCUTANEOUS ONCE
Refills: 0 | Status: COMPLETED | OUTPATIENT
Start: 2019-05-09 | End: 2019-05-09

## 2019-05-09 RX ORDER — DICLOFENAC SODIUM 75 MG/1
1 TABLET, DELAYED RELEASE ORAL
Qty: 0 | Refills: 0 | DISCHARGE

## 2019-05-09 RX ORDER — ACETAMINOPHEN 500 MG
2 TABLET ORAL
Qty: 0 | Refills: 0 | DISCHARGE
Start: 2019-05-09

## 2019-05-09 RX ORDER — PANTOPRAZOLE SODIUM 20 MG/1
1 TABLET, DELAYED RELEASE ORAL
Qty: 30 | Refills: 0
Start: 2019-05-09 | End: 2019-06-07

## 2019-05-09 RX ORDER — HYDROCHLOROTHIAZIDE 25 MG
12.5 TABLET ORAL DAILY
Refills: 0 | Status: DISCONTINUED | OUTPATIENT
Start: 2019-05-09 | End: 2019-05-12

## 2019-05-09 RX ADMIN — CELECOXIB 200 MILLIGRAM(S): 200 CAPSULE ORAL at 05:44

## 2019-05-09 RX ADMIN — SODIUM CHLORIDE 120 MILLILITER(S): 9 INJECTION, SOLUTION INTRAVENOUS at 05:46

## 2019-05-09 RX ADMIN — SENNA PLUS 2 TABLET(S): 8.6 TABLET ORAL at 22:07

## 2019-05-09 RX ADMIN — CELECOXIB 200 MILLIGRAM(S): 200 CAPSULE ORAL at 18:23

## 2019-05-09 RX ADMIN — Medication 15 MILLIGRAM(S): at 13:29

## 2019-05-09 RX ADMIN — Medication 15 MILLIGRAM(S): at 06:30

## 2019-05-09 RX ADMIN — Medication 650 MILLIGRAM(S): at 05:46

## 2019-05-09 RX ADMIN — Medication 100 MILLIGRAM(S): at 22:07

## 2019-05-09 RX ADMIN — Medication 650 MILLIGRAM(S): at 06:30

## 2019-05-09 RX ADMIN — Medication 15 MILLIGRAM(S): at 12:30

## 2019-05-09 RX ADMIN — Medication 650 MILLIGRAM(S): at 00:30

## 2019-05-09 RX ADMIN — ENOXAPARIN SODIUM 30 MILLIGRAM(S): 100 INJECTION SUBCUTANEOUS at 05:46

## 2019-05-09 RX ADMIN — CELECOXIB 200 MILLIGRAM(S): 200 CAPSULE ORAL at 17:16

## 2019-05-09 RX ADMIN — Medication 650 MILLIGRAM(S): at 12:30

## 2019-05-09 RX ADMIN — Medication 15 MILLIGRAM(S): at 00:30

## 2019-05-09 RX ADMIN — Medication 650 MILLIGRAM(S): at 13:29

## 2019-05-09 RX ADMIN — SODIUM CHLORIDE 120 MILLILITER(S): 9 INJECTION, SOLUTION INTRAVENOUS at 22:07

## 2019-05-09 RX ADMIN — POLYETHYLENE GLYCOL 3350 17 GRAM(S): 17 POWDER, FOR SOLUTION ORAL at 12:29

## 2019-05-09 RX ADMIN — Medication 15 MILLIGRAM(S): at 05:45

## 2019-05-09 RX ADMIN — CELECOXIB 200 MILLIGRAM(S): 200 CAPSULE ORAL at 06:30

## 2019-05-09 RX ADMIN — SODIUM CHLORIDE 1000 MILLILITER(S): 9 INJECTION INTRAMUSCULAR; INTRAVENOUS; SUBCUTANEOUS at 16:45

## 2019-05-09 RX ADMIN — Medication 650 MILLIGRAM(S): at 17:16

## 2019-05-09 RX ADMIN — Medication 650 MILLIGRAM(S): at 18:23

## 2019-05-09 RX ADMIN — Medication 2000 MILLIGRAM(S): at 03:52

## 2019-05-09 RX ADMIN — ENOXAPARIN SODIUM 30 MILLIGRAM(S): 100 INJECTION SUBCUTANEOUS at 17:17

## 2019-05-09 RX ADMIN — PANTOPRAZOLE SODIUM 40 MILLIGRAM(S): 20 TABLET, DELAYED RELEASE ORAL at 05:44

## 2019-05-09 RX ADMIN — Medication 100 MILLIGRAM(S): at 05:44

## 2019-05-09 RX ADMIN — Medication 650 MILLIGRAM(S): at 23:23

## 2019-05-09 NOTE — OCCUPATIONAL THERAPY INITIAL EVALUATION ADULT - ADDITIONAL COMMENTS
Pt reports use of SC for the past two years. Pt reports owning SC, raised toilet seat and shower chair.

## 2019-05-09 NOTE — CONSULT NOTE ADULT - ASSESSMENT
48 yo f with hx of htn, afib, s/p left THR    1) HTN- low this AM, asymptomatic   - Hold losartan and HCTZ with SBP <110  2) AF- currently on lovenox, restart xarelto as per ortho team- ideally today or tomorrow  3) DVT PPX- as above  4) Anemia history- f/up AM CBC

## 2019-05-09 NOTE — PROGRESS NOTE ADULT - SUBJECTIVE AND OBJECTIVE BOX
Ortho Note    Pt comfortable without complaints, pain controlled  Denies CP, SOB, N/V, numbness/tingling.     Vital Signs Last 24 Hrs  T(C): 36.5 (05-09-19 @ 09:03), Max: 36.5 (05-09-19 @ 09:03)  T(F): 97.7 (05-09-19 @ 09:03), Max: 97.7 (05-09-19 @ 09:03)  HR: 76 (05-09-19 @ 12:12) (65 - 76)  BP: 95/45 (05-09-19 @ 09:03) (95/45 - 95/45)  BP(mean): --  RR: 18 (05-09-19 @ 12:12) (18 - 18)  SpO2: 96% (05-09-19 @ 12:12) (96% - 100%)  AVSS    General: Pt Alert and oriented, NAD  DSG- prevena to L hip CDI  Pulses: +DP, WWP foot  Sensation: SILT  Motor: 5/5 EHL/FHL/TA/GS                          8.9    13.60 )-----------( 208      ( 09 May 2019 08:11 )             28.4   09 May 2019 06:22    137    |  104    |  17     ----------------------------<  112    4.1     |  23     |  0.73           A/P: 49yFemale POD#1 s/p left total hip replacement- posterior  - Pain Control  - DVT ppx: lovenox bid, home on xarelto  - PT, WBS: WBAT  - Mildly hypotensive: Slightly dizzy OOB. Asymtomatic in bed. Orthostatic VSS. Oral fluids encouraged. Blood pressure medications held.  - dispo: home pending PT clearance    Ortho Pager [phone]

## 2019-05-09 NOTE — OCCUPATIONAL THERAPY INITIAL EVALUATION ADULT - PERTINENT HX OF CURRENT PROBLEM, REHAB EVAL
49F c/o left hip pain x chronic. Pt denies preceding trauma/injury. Pt states her hip pain is localized; she takes Tylenol as needed for pain control. She has failed conservative treatment for her symptoms. Pt endorses sciatic pain/associated tingling of left lower extremity; + weakness with ambulation.

## 2019-05-09 NOTE — CONSULT NOTE ADULT - SUBJECTIVE AND OBJECTIVE BOX
Patient seen and examined, Chart reviewed    HPI:  48yo female with hx of HTN, Afib on xarelto, depression with c/o left hip pain for years. Pt denies preceding trauma/injury. Pt states her hip pain is localized; she takes Tylenol as needed for pain control. She has failed conservative treatment for her symptoms. Pt endorses sciatic pain/associated tingling of left lower extremity; + weakness with ambulation. Pt ambulates with a cane x 2 years. Pt denies DVT hx; denies CP, SOB, N/V, tactile fevers. Pt treated with 3 days of abx regimen for UTI pre op - patient denies urinary symptoms.     She is today POD #1 from an elective left THR      PAST MEDICAL & SURGICAL HISTORY:  Sciatica, unspecified laterality  Herniated intervertebral disc of lumbar spine  HTN (hypertension)  Depression  Afib  Osteoarthritis: bilateral hips  No significant past surgical history      REVIEW OF SYSTEMS:  CONSTITUTIONAL:  No night sweats.  No fatigue,  No fever or chills.  HEENT:  Eyes:  No visual changes.    RESPIRATORY:  No cough.  No wheeze.    No shortness of breath.  CARDIOVASCULAR:  No chest pains.  No palpitations.  GASTROINTESTINAL:  No abdominal pain.  No nausea or vomiting.  No diarrhea    GENITOURINARY:  No urgency.  No frequency.  No dysuria.    MUSCULOSKELETAL:  hip pain present.    SKIN:  No rashes.      acetaminophen   Tablet .. 650 milliGRAM(s) Oral every 6 hours  albumin human  5% IVPB 500 milliLiter(s) IV Intermittent once  aluminum hydroxide/magnesium hydroxide/simethicone Suspension 30 milliLiter(s) Oral four times a day PRN  BUpivacaine liposome 1.3% Injectable (no eMAR) 20 milliLiter(s) Local Injection once  celecoxib 200 milliGRAM(s) Oral two times a day  docusate sodium 100 milliGRAM(s) Oral three times a day  enoxaparin Injectable 30 milliGRAM(s) SubCutaneous two times a day  hydrochlorothiazide 12.5 milliGRAM(s) Oral daily  ketorolac   Injectable 15 milliGRAM(s) IV Push every 6 hours  lactated ringers. 1000 milliLiter(s) IV Continuous <Continuous>  losartan 50 milliGRAM(s) Oral daily  magnesium hydroxide Suspension 30 milliLiter(s) Oral daily PRN  morphine  - Injectable 2 milliGRAM(s) IV Push every 3 hours PRN  morphine  - Injectable 2 milliGRAM(s) IV Push every 15 minutes PRN  ondansetron Injectable 4 milliGRAM(s) IV Push every 6 hours PRN  oxyCODONE    IR 5 milliGRAM(s) Oral every 4 hours PRN  oxyCODONE    IR 10 milliGRAM(s) Oral every 4 hours PRN  pantoprazole    Tablet 40 milliGRAM(s) Oral before breakfast  polyethylene glycol 3350 17 Gram(s) Oral daily  senna 2 Tablet(s) Oral at bedtime      Allergies    penicillin (Hives)    Intolerances        SOCIAL HISTORY:  no tob    FAMILY HISTORY:  nc    Vital Signs Last 24 Hrs  T(C): 36.5 (09 May 2019 04:52), Max: 37 (08 May 2019 07:22)  T(F): 97.7 (09 May 2019 04:52), Max: 98.6 (08 May 2019 07:22)  HR: 67 (09 May 2019 04:52) (59 - 128)  BP: 91/52 (09 May 2019 04:52) (91/52 - 144/90)  BP(mean): 97 (08 May 2019 17:00) (81 - 108)  RR: 18 (09 May 2019 04:52) (16 - 35)  SpO2: 100% (09 May 2019 04:52) (94% - 100%)    05-08 @ 07:01  -  05-09 @ 06:34  --------------------------------------------------------  IN: 2360 mL / OUT: 1100 mL / NET: 1260 mL        PHYSICAL EXAM:   General - NAD, Alert and oriented x 3,   Neck - - No lymphadenopathy  Cardiovascular - RRR no m/r/g, no JVD  Lungs - Clear to auscultation, no use of accessory muscles, no crackles or wheezes.  Skin - No rashes, skin warm and dry, no erythematous areas  Abdomen - Normal bowel sounds, abdomen soft and nontender  Extremeties - No edema,   Neurological – no focal deficits      LABS:          PT/INR - ( 08 May 2019 08:04 )   PT: 12.3 sec;   INR: 1.09            Urinalysis Basic - ( 08 May 2019 08:08 )    Color: Yellow / Appearance: Clear / SG: <=1.005 / pH: x  Gluc: x / Ketone: NEGATIVE  / Bili: Negative / Urobili: 0.2 E.U./dL   Blood: x / Protein: NEGATIVE mg/dL / Nitrite: NEGATIVE   Leuk Esterase: NEGATIVE / RBC: x / WBC x   Sq Epi: x / Non Sq Epi: x / Bacteria: x        RADIOLOGY & ADDITIONAL STUDIES:

## 2019-05-10 LAB
-  AMPICILLIN: SIGNIFICANT CHANGE UP
-  CLINDAMYCIN: SIGNIFICANT CHANGE UP
-  ERYTHROMYCIN: SIGNIFICANT CHANGE UP
-  LEVOFLOXACIN: SIGNIFICANT CHANGE UP
-  PENICILLIN: SIGNIFICANT CHANGE UP
-  VANCOMYCIN: SIGNIFICANT CHANGE UP
ANION GAP SERPL CALC-SCNC: 7 MMOL/L — SIGNIFICANT CHANGE UP (ref 5–17)
ANION GAP SERPL CALC-SCNC: 7 MMOL/L — SIGNIFICANT CHANGE UP (ref 5–17)
BUN SERPL-MCNC: 13 MG/DL — SIGNIFICANT CHANGE UP (ref 7–23)
BUN SERPL-MCNC: 18 MG/DL — SIGNIFICANT CHANGE UP (ref 7–23)
CALCIUM SERPL-MCNC: 8.3 MG/DL — LOW (ref 8.4–10.5)
CALCIUM SERPL-MCNC: 8.9 MG/DL — SIGNIFICANT CHANGE UP (ref 8.4–10.5)
CHLORIDE SERPL-SCNC: 105 MMOL/L — SIGNIFICANT CHANGE UP (ref 96–108)
CHLORIDE SERPL-SCNC: 108 MMOL/L — SIGNIFICANT CHANGE UP (ref 96–108)
CO2 SERPL-SCNC: 25 MMOL/L — SIGNIFICANT CHANGE UP (ref 22–31)
CO2 SERPL-SCNC: 28 MMOL/L — SIGNIFICANT CHANGE UP (ref 22–31)
CREAT SERPL-MCNC: 0.88 MG/DL — SIGNIFICANT CHANGE UP (ref 0.5–1.3)
CREAT SERPL-MCNC: 0.89 MG/DL — SIGNIFICANT CHANGE UP (ref 0.5–1.3)
CULTURE RESULTS: SIGNIFICANT CHANGE UP
GLUCOSE SERPL-MCNC: 139 MG/DL — HIGH (ref 70–99)
GLUCOSE SERPL-MCNC: 97 MG/DL — SIGNIFICANT CHANGE UP (ref 70–99)
HCT VFR BLD CALC: 24 % — LOW (ref 34.5–45)
HCT VFR BLD CALC: 26.1 % — LOW (ref 34.5–45)
HGB BLD-MCNC: 7.5 G/DL — LOW (ref 11.5–15.5)
HGB BLD-MCNC: 8.2 G/DL — LOW (ref 11.5–15.5)
MCHC RBC-ENTMCNC: 31.1 PG — SIGNIFICANT CHANGE UP (ref 27–34)
MCHC RBC-ENTMCNC: 31.3 GM/DL — LOW (ref 32–36)
MCHC RBC-ENTMCNC: 31.4 GM/DL — LOW (ref 32–36)
MCHC RBC-ENTMCNC: 31.4 PG — SIGNIFICANT CHANGE UP (ref 27–34)
MCV RBC AUTO: 100.4 FL — HIGH (ref 80–100)
MCV RBC AUTO: 98.9 FL — SIGNIFICANT CHANGE UP (ref 80–100)
METHOD TYPE: SIGNIFICANT CHANGE UP
NRBC # BLD: 0 /100 WBCS — SIGNIFICANT CHANGE UP (ref 0–0)
NRBC # BLD: 0 /100 WBCS — SIGNIFICANT CHANGE UP (ref 0–0)
ORGANISM # SPEC MICROSCOPIC CNT: SIGNIFICANT CHANGE UP
ORGANISM # SPEC MICROSCOPIC CNT: SIGNIFICANT CHANGE UP
PLATELET # BLD AUTO: 187 K/UL — SIGNIFICANT CHANGE UP (ref 150–400)
PLATELET # BLD AUTO: 201 K/UL — SIGNIFICANT CHANGE UP (ref 150–400)
POTASSIUM SERPL-MCNC: 3.7 MMOL/L — SIGNIFICANT CHANGE UP (ref 3.5–5.3)
POTASSIUM SERPL-MCNC: 4.1 MMOL/L — SIGNIFICANT CHANGE UP (ref 3.5–5.3)
POTASSIUM SERPL-SCNC: 3.7 MMOL/L — SIGNIFICANT CHANGE UP (ref 3.5–5.3)
POTASSIUM SERPL-SCNC: 4.1 MMOL/L — SIGNIFICANT CHANGE UP (ref 3.5–5.3)
RBC # BLD: 2.39 M/UL — LOW (ref 3.8–5.2)
RBC # BLD: 2.64 M/UL — LOW (ref 3.8–5.2)
RBC # FLD: 12.8 % — SIGNIFICANT CHANGE UP (ref 10.3–14.5)
RBC # FLD: 12.9 % — SIGNIFICANT CHANGE UP (ref 10.3–14.5)
SODIUM SERPL-SCNC: 140 MMOL/L — SIGNIFICANT CHANGE UP (ref 135–145)
SODIUM SERPL-SCNC: 140 MMOL/L — SIGNIFICANT CHANGE UP (ref 135–145)
SPECIMEN SOURCE: SIGNIFICANT CHANGE UP
WBC # BLD: 8.7 K/UL — SIGNIFICANT CHANGE UP (ref 3.8–10.5)
WBC # BLD: 9.21 K/UL — SIGNIFICANT CHANGE UP (ref 3.8–10.5)
WBC # FLD AUTO: 8.7 K/UL — SIGNIFICANT CHANGE UP (ref 3.8–10.5)
WBC # FLD AUTO: 9.21 K/UL — SIGNIFICANT CHANGE UP (ref 3.8–10.5)

## 2019-05-10 PROCEDURE — 99233 SBSQ HOSP IP/OBS HIGH 50: CPT

## 2019-05-10 RX ORDER — RIVAROXABAN 15 MG-20MG
20 KIT ORAL DAILY
Refills: 0 | Status: DISCONTINUED | OUTPATIENT
Start: 2019-05-11 | End: 2019-05-12

## 2019-05-10 RX ORDER — ENOXAPARIN SODIUM 100 MG/ML
30 INJECTION SUBCUTANEOUS ONCE
Refills: 0 | Status: COMPLETED | OUTPATIENT
Start: 2019-05-10 | End: 2019-05-10

## 2019-05-10 RX ADMIN — CELECOXIB 200 MILLIGRAM(S): 200 CAPSULE ORAL at 20:20

## 2019-05-10 RX ADMIN — Medication 650 MILLIGRAM(S): at 06:35

## 2019-05-10 RX ADMIN — CELECOXIB 200 MILLIGRAM(S): 200 CAPSULE ORAL at 06:34

## 2019-05-10 RX ADMIN — CELECOXIB 200 MILLIGRAM(S): 200 CAPSULE ORAL at 19:09

## 2019-05-10 RX ADMIN — Medication 650 MILLIGRAM(S): at 07:00

## 2019-05-10 RX ADMIN — SODIUM CHLORIDE 120 MILLILITER(S): 9 INJECTION, SOLUTION INTRAVENOUS at 06:33

## 2019-05-10 RX ADMIN — PANTOPRAZOLE SODIUM 40 MILLIGRAM(S): 20 TABLET, DELAYED RELEASE ORAL at 06:33

## 2019-05-10 RX ADMIN — Medication 650 MILLIGRAM(S): at 19:09

## 2019-05-10 RX ADMIN — ENOXAPARIN SODIUM 30 MILLIGRAM(S): 100 INJECTION SUBCUTANEOUS at 06:35

## 2019-05-10 RX ADMIN — Medication 650 MILLIGRAM(S): at 20:20

## 2019-05-10 RX ADMIN — POLYETHYLENE GLYCOL 3350 17 GRAM(S): 17 POWDER, FOR SOLUTION ORAL at 12:56

## 2019-05-10 RX ADMIN — Medication 100 MILLIGRAM(S): at 06:34

## 2019-05-10 RX ADMIN — Medication 650 MILLIGRAM(S): at 12:56

## 2019-05-10 RX ADMIN — CELECOXIB 200 MILLIGRAM(S): 200 CAPSULE ORAL at 07:00

## 2019-05-10 RX ADMIN — Medication 650 MILLIGRAM(S): at 00:00

## 2019-05-10 RX ADMIN — ENOXAPARIN SODIUM 30 MILLIGRAM(S): 100 INJECTION SUBCUTANEOUS at 19:10

## 2019-05-10 RX ADMIN — OXYCODONE HYDROCHLORIDE 10 MILLIGRAM(S): 5 TABLET ORAL at 19:13

## 2019-05-10 RX ADMIN — Medication 650 MILLIGRAM(S): at 23:29

## 2019-05-10 RX ADMIN — OXYCODONE HYDROCHLORIDE 10 MILLIGRAM(S): 5 TABLET ORAL at 20:20

## 2019-05-10 RX ADMIN — Medication 650 MILLIGRAM(S): at 15:30

## 2019-05-10 NOTE — PROGRESS NOTE ADULT - SUBJECTIVE AND OBJECTIVE BOX
Orthopedics     AFIB RVR overnight for 10 minutes. Pt felt anxious at the time. EKG performed, return to NSR with no problems.   Pain well controlled this am.   Denies CP, SOB, N/V, numbness/tingling     Vital Signs Last 24 Hrs  T(C): 37.3 (10 May 2019 14:49), Max: 37.3 (10 May 2019 08:10)  T(F): 99.1 (10 May 2019 14:49), Max: 99.1 (10 May 2019 08:10)  HR: 82 (10 May 2019 14:49) (72 - 84)  BP: 128/69 (10 May 2019 14:49) (88/58 - 128/69)  BP(mean): --  RR: 17 (10 May 2019 14:49) (16 - 19)  SpO2: 100% (10 May 2019 14:49) (97% - 100%)    General: Pt Alert and oriented, comfortable  Dressing C/D/I. Prevena in place.  Sensation intact and equal BLE   Motor ehl/ta/gs/fhl 5/5 BLE   Pulses dp palpable BLE, skin warm and well perfused     A/P: 49yFemale s/p left SINCERE   - Consider restarted home afib meds today   - Stable  - Pain Control  - DVT ppx: Lovenox  - Desiree-op abx: Ancef  - PT, WBS: WBAT   - F/U AM Labs

## 2019-05-10 NOTE — PROGRESS NOTE ADULT - ASSESSMENT
48 yo f with hx of htn, afib, s/p left THR    1) HTN- low this AM,    -Continue to hold losartan and only dose HCTZ if SBP >110  2) AF- currently on lovenox, restart xarelto as per ortho team- ideally today or tomorrow   - Discussed use of a B blocker with patient. She gets these paroxysmal events frequently.  She will schedule a follow up with her cardiologist  3) DVT PPX- as above  4) Anemia history-down to 7.5 and some dizziness, reasonable to transfuse 1 unit.

## 2019-05-10 NOTE — PROVIDER CONTACT NOTE (OTHER) - ACTION/TREATMENT ORDERED:
Said MD was paged a few times and sent a text message during the afib episode. Follow-up text via [email or web address] sent at 150 am. Same MD was made aware ECG was done and filed in px's chart-no new order

## 2019-05-10 NOTE — PROGRESS NOTE ADULT - SUBJECTIVE AND OBJECTIVE BOX
INTERVAL HPI/OVERNIGHT EVENTS:  Rapid AF overnight.  Patient could feel the palpitations. Currently feels fine. Pain controlled.    MEDICATIONS  (STANDING):  acetaminophen   Tablet .. 650 milliGRAM(s) Oral every 6 hours  albumin human  5% IVPB 500 milliLiter(s) IV Intermittent once  BUpivacaine liposome 1.3% Injectable (no eMAR) 20 milliLiter(s) Local Injection once  celecoxib 200 milliGRAM(s) Oral two times a day  docusate sodium 100 milliGRAM(s) Oral three times a day  enoxaparin Injectable 30 milliGRAM(s) SubCutaneous two times a day  hydrochlorothiazide 12.5 milliGRAM(s) Oral daily  lactated ringers. 1000 milliLiter(s) (120 mL/Hr) IV Continuous <Continuous>  pantoprazole    Tablet 40 milliGRAM(s) Oral before breakfast  polyethylene glycol 3350 17 Gram(s) Oral daily  senna 2 Tablet(s) Oral at bedtime    MEDICATIONS  (PRN):  aluminum hydroxide/magnesium hydroxide/simethicone Suspension 30 milliLiter(s) Oral four times a day PRN Indigestion  bisacodyl Suppository 10 milliGRAM(s) Rectal daily PRN If no bowel movement by POD#2  magnesium hydroxide Suspension 30 milliLiter(s) Oral daily PRN Constipation  morphine  - Injectable 2 milliGRAM(s) IV Push every 3 hours PRN Severe Pain (7 - 10)  morphine  - Injectable 2 milliGRAM(s) IV Push every 15 minutes PRN Severe Pain (7 - 10)  ondansetron Injectable 4 milliGRAM(s) IV Push every 6 hours PRN Nausea and/or Vomiting  oxyCODONE    IR 5 milliGRAM(s) Oral every 4 hours PRN Mild Pain (1 - 3)  oxyCODONE    IR 10 milliGRAM(s) Oral every 4 hours PRN Moderate Pain (4 - 6)      Allergies    penicillin (Hives)      REVIEW OF SYSTEMS:  CONSTITUTIONAL:  No night sweats.  No fatigue,  No fever or chills.  HEENT:  Eyes:  No visual changes.    RESPIRATORY:  No cough.  No wheeze.    No shortness of breath.  CARDIOVASCULAR:  No chest pains.  No palpitations.  GASTROINTESTINAL:  No abdominal pain.  No nausea or vomiting.  No diarrhea    GENITOURINARY:  No urgency.  No frequency.  No dysuria.    MUSCULOSKELETAL:  hip pain present.    SKIN:  No rashes.      T(C): 36.8 (05-10-19 @ 04:44), Max: 36.8 (05-10-19 @ 04:44)  HR: 72 (05-10-19 @ 04:44) (65 - 84)  BP: 98/50 (05-10-19 @ 04:44) (88/58 - 102/67)  RR: 18 (05-10-19 @ 04:44) (16 - 19)  SpO2: 98% (05-10-19 @ 04:44) (96% - 100%)  Wt(kg): --    PHYSICAL EXAM:   General - NAD, Alert and oriented x 3,   Neck - - No lymphadenopathy  Cardiovascular - RRR no m/r/g, no JVD  Lungs - Clear to auscultation, no use of accessory muscles, no crackles or wheezes.  Skin - No rashes, skin warm and dry, no erythematous areas  Abdomen - Normal bowel sounds, abdomen soft and nontender  Extremeties - No edema,   Neurological – no focal deficits                        7.5    8.70  )-----------( 187      ( 10 May 2019 07:39 )             24.0     05-09    137  |  104  |  17  ----------------------------<  112<H>  4.1   |  23  |  0.73    Ca    9.2      09 May 2019 06:22        Urinalysis Basic - ( 08 May 2019 08:08 )    Color: Yellow / Appearance: Clear / SG: <=1.005 / pH: x  Gluc: x / Ketone: NEGATIVE  / Bili: Negative / Urobili: 0.2 E.U./dL   Blood: x / Protein: NEGATIVE mg/dL / Nitrite: NEGATIVE   Leuk Esterase: NEGATIVE / RBC: x / WBC x   Sq Epi: x / Non Sq Epi: x / Bacteria: x        RADIOLOGY & ADDITIONAL TESTS:

## 2019-05-10 NOTE — PROGRESS NOTE ADULT - SUBJECTIVE AND OBJECTIVE BOX
Ortho Note    Pt comfortable without complaints, pain controlled.  Denies CP, SOB, N/V, numbness/tingling. Had episode of rapid afib overnight, and had palpitations.  Currently feels "fine". Reports she often has episodes of rapid heartbeats at home, and follows up with a cardiologist.     Vital Signs Last 24 Hrs  T(C): 37.3 (05-10-19 @ 08:10), Max: 37.3 (05-10-19 @ 08:10)  T(F): 99.1 (05-10-19 @ 08:10), Max: 99.1 (05-10-19 @ 08:10)  HR: 82 (05-10-19 @ 08:10) (82 - 82)  BP: 101/56 (05-10-19 @ 08:10) (101/56 - 101/56)  BP(mean): --  RR: 17 (05-10-19 @ 08:10) (17 - 17)  SpO2: 99% (05-10-19 @ 08:10) (99% - 99%)  AVSS    General: Pt Alert and oriented, NAD  left lower extremity:  DSG- prevena to L hip CDI  Pulses: +DP, WWP foot  Sensation: SILT  Motor: 5/5 EHL/FHL/TA/GS                          7.5    8.70  )-----------( 187      ( 10 May 2019 07:39 )             24.0   10 May 2019 07:39    140    |  105    |  18     ----------------------------<  97     3.7     |  28     |  0.89     Ca    8.9        10 May 2019 07:39        A/P: 49yFemale POD#2 s/p left total hip replacement (posterior approach)  - H+H 7.5/ 24.0- has h/o anemia. C/o dizziness yesterday, but currently asymptomatic. -130s OOB. Repeat CBC in afternoon  - Pain Control  - DVT ppx: lovenox in-house- d/c on xarelto  - PT, WBS: WBAT, posterior precautions  - continue bowel regimen  - dispo: home pending labs and PT clearance    Ortho Pager [phone]

## 2019-05-11 LAB
BLD GP AB SCN SERPL QL: NEGATIVE — SIGNIFICANT CHANGE UP
HCT VFR BLD CALC: 22 % — LOW (ref 34.5–45)
HCT VFR BLD CALC: 25.5 % — LOW (ref 34.5–45)
HGB BLD-MCNC: 6.8 G/DL — CRITICAL LOW (ref 11.5–15.5)
HGB BLD-MCNC: 7.9 G/DL — LOW (ref 11.5–15.5)
MAGNESIUM SERPL-MCNC: 1.8 MG/DL — SIGNIFICANT CHANGE UP (ref 1.6–2.6)
MCHC RBC-ENTMCNC: 30.9 GM/DL — LOW (ref 32–36)
MCHC RBC-ENTMCNC: 31 GM/DL — LOW (ref 32–36)
MCHC RBC-ENTMCNC: 31.2 PG — SIGNIFICANT CHANGE UP (ref 27–34)
MCHC RBC-ENTMCNC: 31.3 PG — SIGNIFICANT CHANGE UP (ref 27–34)
MCV RBC AUTO: 100.9 FL — HIGH (ref 80–100)
MCV RBC AUTO: 101.2 FL — HIGH (ref 80–100)
NRBC # BLD: 0 /100 WBCS — SIGNIFICANT CHANGE UP (ref 0–0)
NRBC # BLD: 0 /100 WBCS — SIGNIFICANT CHANGE UP (ref 0–0)
PLATELET # BLD AUTO: 176 K/UL — SIGNIFICANT CHANGE UP (ref 150–400)
PLATELET # BLD AUTO: 208 K/UL — SIGNIFICANT CHANGE UP (ref 150–400)
RBC # BLD: 2.18 M/UL — LOW (ref 3.8–5.2)
RBC # BLD: 2.52 M/UL — LOW (ref 3.8–5.2)
RBC # FLD: 13 % — SIGNIFICANT CHANGE UP (ref 10.3–14.5)
RBC # FLD: 13.1 % — SIGNIFICANT CHANGE UP (ref 10.3–14.5)
RH IG SCN BLD-IMP: POSITIVE — SIGNIFICANT CHANGE UP
WBC # BLD: 7.83 K/UL — SIGNIFICANT CHANGE UP (ref 3.8–10.5)
WBC # BLD: 8.33 K/UL — SIGNIFICANT CHANGE UP (ref 3.8–10.5)
WBC # FLD AUTO: 7.83 K/UL — SIGNIFICANT CHANGE UP (ref 3.8–10.5)
WBC # FLD AUTO: 8.33 K/UL — SIGNIFICANT CHANGE UP (ref 3.8–10.5)

## 2019-05-11 RX ADMIN — OXYCODONE HYDROCHLORIDE 5 MILLIGRAM(S): 5 TABLET ORAL at 22:00

## 2019-05-11 RX ADMIN — CELECOXIB 200 MILLIGRAM(S): 200 CAPSULE ORAL at 06:11

## 2019-05-11 RX ADMIN — RIVAROXABAN 20 MILLIGRAM(S): KIT at 13:29

## 2019-05-11 RX ADMIN — Medication 650 MILLIGRAM(S): at 06:11

## 2019-05-11 RX ADMIN — OXYCODONE HYDROCHLORIDE 5 MILLIGRAM(S): 5 TABLET ORAL at 13:44

## 2019-05-11 RX ADMIN — PANTOPRAZOLE SODIUM 40 MILLIGRAM(S): 20 TABLET, DELAYED RELEASE ORAL at 06:14

## 2019-05-11 RX ADMIN — Medication 650 MILLIGRAM(S): at 00:30

## 2019-05-11 RX ADMIN — CELECOXIB 200 MILLIGRAM(S): 200 CAPSULE ORAL at 18:18

## 2019-05-11 RX ADMIN — OXYCODONE HYDROCHLORIDE 5 MILLIGRAM(S): 5 TABLET ORAL at 14:15

## 2019-05-11 RX ADMIN — Medication 650 MILLIGRAM(S): at 14:00

## 2019-05-11 RX ADMIN — CELECOXIB 200 MILLIGRAM(S): 200 CAPSULE ORAL at 07:00

## 2019-05-11 RX ADMIN — Medication 650 MILLIGRAM(S): at 07:00

## 2019-05-11 RX ADMIN — OXYCODONE HYDROCHLORIDE 5 MILLIGRAM(S): 5 TABLET ORAL at 21:11

## 2019-05-11 RX ADMIN — Medication 650 MILLIGRAM(S): at 13:29

## 2019-05-11 NOTE — PROGRESS NOTE ADULT - SUBJECTIVE AND OBJECTIVE BOX
Orthopedics     No palpitations overnight. Performed stairs with PT.    Pain well controlled this am.   Denies CP, SOB, N/V, numbness/tingling     Vital Signs Last 24 Hrs  T(C): 37.1 (11 May 2019 09:31), Max: 37.3 (10 May 2019 14:49)  T(F): 98.7 (11 May 2019 09:31), Max: 99.1 (10 May 2019 14:49)  HR: 85 (11 May 2019 09:31) (74 - 96)  BP: 119/74 (11 May 2019 09:31) (92/54 - 128/69)  BP(mean): --  RR: 20 (11 May 2019 09:31) (17 - 20)  SpO2: 100% (11 May 2019 09:31) (97% - 100%)    General: Pt Alert and oriented, comfortable  Dressing C/D/I. Prevena in place.  Sensation intact and equal BLE   Motor ehl/ta/gs/fhl 5/5 BLE   Pulses dp palpable BLE, skin warm and well perfused     A/P: 49yFemale s/p left SINCERE     - Stable  - Pain Control  - DVT ppx: xarelto (home med for afib)   - Desiree-op abx: Ancef  - PT, WBS: WBAT   - F/U AM Labs

## 2019-05-12 ENCOUNTER — TRANSCRIPTION ENCOUNTER (OUTPATIENT)
Age: 49
End: 2019-05-12

## 2019-05-12 VITALS
DIASTOLIC BLOOD PRESSURE: 79 MMHG | HEART RATE: 87 BPM | OXYGEN SATURATION: 98 % | TEMPERATURE: 99 F | RESPIRATION RATE: 17 BRPM | SYSTOLIC BLOOD PRESSURE: 117 MMHG

## 2019-05-12 LAB
ANION GAP SERPL CALC-SCNC: 9 MMOL/L — SIGNIFICANT CHANGE UP (ref 5–17)
BUN SERPL-MCNC: 11 MG/DL — SIGNIFICANT CHANGE UP (ref 7–23)
CALCIUM SERPL-MCNC: 8.8 MG/DL — SIGNIFICANT CHANGE UP (ref 8.4–10.5)
CHLORIDE SERPL-SCNC: 103 MMOL/L — SIGNIFICANT CHANGE UP (ref 96–108)
CO2 SERPL-SCNC: 26 MMOL/L — SIGNIFICANT CHANGE UP (ref 22–31)
CREAT SERPL-MCNC: 0.8 MG/DL — SIGNIFICANT CHANGE UP (ref 0.5–1.3)
GLUCOSE SERPL-MCNC: 93 MG/DL — SIGNIFICANT CHANGE UP (ref 70–99)
HCT VFR BLD CALC: 23.3 % — LOW (ref 34.5–45)
HGB BLD-MCNC: 7.2 G/DL — LOW (ref 11.5–15.5)
MCHC RBC-ENTMCNC: 30.9 GM/DL — LOW (ref 32–36)
MCHC RBC-ENTMCNC: 31.4 PG — SIGNIFICANT CHANGE UP (ref 27–34)
MCV RBC AUTO: 101.7 FL — HIGH (ref 80–100)
NRBC # BLD: 0 /100 WBCS — SIGNIFICANT CHANGE UP (ref 0–0)
PLATELET # BLD AUTO: 217 K/UL — SIGNIFICANT CHANGE UP (ref 150–400)
POTASSIUM SERPL-MCNC: 3.9 MMOL/L — SIGNIFICANT CHANGE UP (ref 3.5–5.3)
POTASSIUM SERPL-SCNC: 3.9 MMOL/L — SIGNIFICANT CHANGE UP (ref 3.5–5.3)
RBC # BLD: 2.29 M/UL — LOW (ref 3.8–5.2)
RBC # FLD: 12.9 % — SIGNIFICANT CHANGE UP (ref 10.3–14.5)
SODIUM SERPL-SCNC: 138 MMOL/L — SIGNIFICANT CHANGE UP (ref 135–145)
WBC # BLD: 8.46 K/UL — SIGNIFICANT CHANGE UP (ref 3.8–10.5)
WBC # FLD AUTO: 8.46 K/UL — SIGNIFICANT CHANGE UP (ref 3.8–10.5)

## 2019-05-12 PROCEDURE — 84132 ASSAY OF SERUM POTASSIUM: CPT

## 2019-05-12 PROCEDURE — 82962 GLUCOSE BLOOD TEST: CPT

## 2019-05-12 PROCEDURE — C1776: CPT

## 2019-05-12 PROCEDURE — 83735 ASSAY OF MAGNESIUM: CPT

## 2019-05-12 PROCEDURE — 87086 URINE CULTURE/COLONY COUNT: CPT

## 2019-05-12 PROCEDURE — 84295 ASSAY OF SERUM SODIUM: CPT

## 2019-05-12 PROCEDURE — 85027 COMPLETE CBC AUTOMATED: CPT

## 2019-05-12 PROCEDURE — S2900: CPT

## 2019-05-12 PROCEDURE — 72170 X-RAY EXAM OF PELVIS: CPT

## 2019-05-12 PROCEDURE — 86900 BLOOD TYPING SEROLOGIC ABO: CPT

## 2019-05-12 PROCEDURE — 81003 URINALYSIS AUTO W/O SCOPE: CPT

## 2019-05-12 PROCEDURE — 36415 COLL VENOUS BLD VENIPUNCTURE: CPT

## 2019-05-12 PROCEDURE — 80048 BASIC METABOLIC PNL TOTAL CA: CPT

## 2019-05-12 PROCEDURE — 86850 RBC ANTIBODY SCREEN: CPT

## 2019-05-12 PROCEDURE — 88300 SURGICAL PATH GROSS: CPT

## 2019-05-12 PROCEDURE — 97116 GAIT TRAINING THERAPY: CPT

## 2019-05-12 PROCEDURE — 86901 BLOOD TYPING SEROLOGIC RH(D): CPT

## 2019-05-12 PROCEDURE — 97161 PT EVAL LOW COMPLEX 20 MIN: CPT

## 2019-05-12 PROCEDURE — 87184 SC STD DISK METHOD PER PLATE: CPT

## 2019-05-12 PROCEDURE — 85610 PROTHROMBIN TIME: CPT

## 2019-05-12 PROCEDURE — 82330 ASSAY OF CALCIUM: CPT

## 2019-05-12 PROCEDURE — 85018 HEMOGLOBIN: CPT

## 2019-05-12 RX ORDER — MELOXICAM 15 MG/1
1 TABLET ORAL
Qty: 28 | Refills: 0
Start: 2019-05-12 | End: 2019-06-08

## 2019-05-12 RX ORDER — FERROUS SULFATE 325(65) MG
325 TABLET ORAL DAILY
Refills: 0 | Status: DISCONTINUED | OUTPATIENT
Start: 2019-05-12 | End: 2019-05-12

## 2019-05-12 RX ORDER — FERROUS SULFATE 325(65) MG
1 TABLET ORAL
Qty: 28 | Refills: 0
Start: 2019-05-12 | End: 2019-05-25

## 2019-05-12 RX ADMIN — Medication 325 MILLIGRAM(S): at 12:08

## 2019-05-12 RX ADMIN — OXYCODONE HYDROCHLORIDE 5 MILLIGRAM(S): 5 TABLET ORAL at 10:09

## 2019-05-12 RX ADMIN — OXYCODONE HYDROCHLORIDE 5 MILLIGRAM(S): 5 TABLET ORAL at 14:13

## 2019-05-12 RX ADMIN — OXYCODONE HYDROCHLORIDE 5 MILLIGRAM(S): 5 TABLET ORAL at 09:06

## 2019-05-12 RX ADMIN — OXYCODONE HYDROCHLORIDE 5 MILLIGRAM(S): 5 TABLET ORAL at 15:15

## 2019-05-12 RX ADMIN — OXYCODONE HYDROCHLORIDE 5 MILLIGRAM(S): 5 TABLET ORAL at 03:00

## 2019-05-12 RX ADMIN — PANTOPRAZOLE SODIUM 40 MILLIGRAM(S): 20 TABLET, DELAYED RELEASE ORAL at 07:14

## 2019-05-12 RX ADMIN — CELECOXIB 200 MILLIGRAM(S): 200 CAPSULE ORAL at 05:52

## 2019-05-12 RX ADMIN — OXYCODONE HYDROCHLORIDE 5 MILLIGRAM(S): 5 TABLET ORAL at 02:14

## 2019-05-12 RX ADMIN — Medication 12.5 MILLIGRAM(S): at 09:06

## 2019-05-12 RX ADMIN — POLYETHYLENE GLYCOL 3350 17 GRAM(S): 17 POWDER, FOR SOLUTION ORAL at 12:10

## 2019-05-12 RX ADMIN — RIVAROXABAN 20 MILLIGRAM(S): KIT at 12:08

## 2019-05-12 NOTE — DISCHARGE NOTE NURSING/CASE MANAGEMENT/SOCIAL WORK - NSDCDPATPORTLINK_GEN_ALL_CORE
You can access the [facility], offered by [facility], by registering with the following website: [email or web address]

## 2019-05-12 NOTE — PROGRESS NOTE ADULT - REASON FOR ADMISSION
left hip pan

## 2019-05-12 NOTE — PROGRESS NOTE ADULT - SUBJECTIVE AND OBJECTIVE BOX
Orthopedics     Did well with stairs with PT; feeling better this am. No complaints      Pain well controlled this am.   Denies CP, SOB, N/V, numbness/tingling     Vital Signs Last 24 Hrs  T(C): 37.1 (12 May 2019 09:01), Max: 37.5 (11 May 2019 18:11)  T(F): 98.8 (12 May 2019 09:01), Max: 99.5 (11 May 2019 18:11)  HR: 96 (12 May 2019 09:01) (79 - 96)  BP: 135/78 (12 May 2019 09:01) (94/57 - 135/78)  BP(mean): --  RR: 16 (12 May 2019 09:01) (16 - 18)  SpO2: 99% (12 May 2019 09:01) (97% - 100%)    General: Pt Alert and oriented, comfortable  Dressing C/D/I. Prevena in place.  Sensation intact and equal BLE   Motor ehl/ta/gs/fhl 5/5 BLE   Pulses dp palpable BLE, skin warm and well perfused     A/P: 49yFemale s/p left ISNCERE     - Stable  - Pain Control  - DVT ppx: xarelto (home med for afib)   - Desiree-op abx: Ancef  - PT, WBS: WBAT   - F/U AM Labs

## 2019-05-14 PROBLEM — M54.30 SCIATICA, UNSPECIFIED SIDE: Chronic | Status: ACTIVE | Noted: 2019-05-08

## 2019-05-16 DIAGNOSIS — I48.91 UNSPECIFIED ATRIAL FIBRILLATION: ICD-10-CM

## 2019-05-16 DIAGNOSIS — M16.12 UNILATERAL PRIMARY OSTEOARTHRITIS, LEFT HIP: ICD-10-CM

## 2019-05-16 DIAGNOSIS — M51.26 OTHER INTERVERTEBRAL DISC DISPLACEMENT, LUMBAR REGION: ICD-10-CM

## 2019-05-16 DIAGNOSIS — I10 ESSENTIAL (PRIMARY) HYPERTENSION: ICD-10-CM

## 2019-05-16 DIAGNOSIS — I95.9 HYPOTENSION, UNSPECIFIED: ICD-10-CM

## 2019-05-16 DIAGNOSIS — F32.9 MAJOR DEPRESSIVE DISORDER, SINGLE EPISODE, UNSPECIFIED: ICD-10-CM

## 2019-05-16 LAB — SURGICAL PATHOLOGY STUDY: SIGNIFICANT CHANGE UP

## 2019-05-22 ENCOUNTER — FORM ENCOUNTER (OUTPATIENT)
Age: 49
End: 2019-05-22

## 2019-05-23 ENCOUNTER — OUTPATIENT (OUTPATIENT)
Dept: OUTPATIENT SERVICES | Facility: HOSPITAL | Age: 49
LOS: 1 days | End: 2019-05-23
Payer: COMMERCIAL

## 2019-05-23 ENCOUNTER — APPOINTMENT (OUTPATIENT)
Dept: ORTHOPEDIC SURGERY | Facility: CLINIC | Age: 49
End: 2019-05-23
Payer: MEDICAID

## 2019-05-23 PROCEDURE — 73502 X-RAY EXAM HIP UNI 2-3 VIEWS: CPT | Mod: 26,LT

## 2019-05-23 PROCEDURE — 99024 POSTOP FOLLOW-UP VISIT: CPT

## 2019-05-23 PROCEDURE — 73502 X-RAY EXAM HIP UNI 2-3 VIEWS: CPT

## 2019-05-23 RX ORDER — DICLOFENAC POTASSIUM 50 MG/1
TABLET, COATED ORAL
Refills: 0 | Status: DISCONTINUED | COMMUNITY
End: 2019-05-23

## 2019-05-23 RX ORDER — OXYCODONE HYDROCHLORIDE AND ACETAMINOPHEN 10; 325 MG/1; MG/1
TABLET ORAL
Refills: 0 | Status: DISCONTINUED | COMMUNITY
End: 2019-05-23

## 2019-05-23 RX ORDER — CELECOXIB 100 MG/1
100 CAPSULE ORAL TWICE DAILY
Qty: 84 | Refills: 0 | Status: DISCONTINUED | COMMUNITY
Start: 2019-05-08 | End: 2019-05-23

## 2019-05-23 NOTE — HISTORY OF PRESENT ILLNESS
[Neuro Intact] : an unremarkable neurological exam [Vascular Intact] : ~T peripheral vascular exam normal [Negative Suzanne's] : maneuvers demonstrated a negative Suzanne's sign [Doing Well] : is doing well [Excellent Pain Control] : has excellent pain control [No Sign of Infection] : is showing no signs of infection [Erythema] : not erythematous [Discharge] : absent of discharge [de-identified] : First post op left total hip replacement, surgical date 5/8/19 [de-identified] : Patient presents for first post op visit s/p left posterior SINCERE. 5/8/19. She states has minimal pain, well controlled with percocet bid. She is ambulating with a walker and doing HEP. She is following posterior precautions. She states with regards to her left hip she feels great but is now more limited by the right hip. [de-identified] : Patient is alert and oriented x3\par Left hip: Well-healing surgical scar, mid incision with 2x2 mm area of granulation tissue, no drainage, erythema or ecchymosis. Acceptable post op swelling.ROM from full extension to 80 degrees of flexion, external rotation 20 degrees, internal neutral, abduction 35 degrees, adduction 5 degrees. Calf soft and nontender. NVI. Flexor power 5-/5. [de-identified] : X-rays taken today demonstrate left hip with well-fixed total hip replacement in overall good alignment [de-identified] : Patient is progressing well 2 weeks s/p left SINCERE. Advised to gradaully transition from walker to cane. Gradually wean off pain medication. Continue ASA BID until 4 weeks post op. Patient would like to discuss proceeding with right SINCERE at next visit. She will follow up in 4-6 weeks.

## 2019-05-23 NOTE — ADDENDUM
[FreeTextEntry1] : Dr. Gleason was physically present during the key portions the encounter, provided assistance as needed, and formulated the assessment and plan as documented above.\par \par

## 2019-07-02 ENCOUNTER — APPOINTMENT (OUTPATIENT)
Dept: ORTHOPEDIC SURGERY | Facility: CLINIC | Age: 49
End: 2019-07-02
Payer: MEDICAID

## 2019-07-02 PROCEDURE — 99024 POSTOP FOLLOW-UP VISIT: CPT

## 2019-07-02 NOTE — END OF VISIT
[FreeTextEntry3] : All medical record entries made by Layla Nascimento acting as a scribe for the performing provider (Rufino Gleason MD and/or REYNALDO Ramirez) on 07/02/2019. All entries were dictated to me by the performing medical provider. In signing this record, the medical provider affirms that they have personally performed the history, physical exam, assessment and plan and have also directed, reviewed and agreed to the documentation in the chart.

## 2019-07-02 NOTE — HISTORY OF PRESENT ILLNESS
[Clean/Dry/Intact] : clean, dry and intact [Healed] : healed [Doing Well] : is doing well [Excellent Pain Control] : has excellent pain control [No Sign of Infection] : is showing no signs of infection [Chills] : no chills [Fever] : no fever [de-identified] : second post op left total hip replacement, surgical date 05/08/19 and right hip surgical consult  [de-identified] : 49 year old female presents for post op s/p left THR 5/8/19 and for surgical consult of right hip. She is doing well overall. She does not report any left hip pain. She reports moderate, daily right hip pain localized to the thigh and side of the hip. The pain occurs upon rising in the morning, with activity and at night. She has difficulty placing shoes on the right foot. Patient can walk less than 5 blocks with a moderate limp and a cane most of the time because of her right hip. She uses a rail to ascend and descend stairs and cannot enter public transportation. Ms. Boswell is interested in a right THR.  [de-identified] : Patient is alert and oriented x3.\par Leg lengths clinically equal.\par Left hip: Well-healed surgical scar without erythema or ecchymosis. Acceptable post-op swelling. ROM from full extension to 70 degrees of flexion. 10 degrees of internal rotation. 25 degrees of external rotation. 35 degrees of abduction. 10 degrees of adduction. Flexor power 4+/5. Abductor power 5-/5.\par Calf is soft and nontender.\par NVI.\par \par Right Hip:\par Skin intact. No surgical scars. No erythema. No ecchymosis. No swelling. No deformity. No focal tenderness.\par Painful ROM from full extension to 30 degrees of flexion. 0 degrees of internal rotation. 10 degrees of external rotation. 15 degrees of abduction. 0 degrees of adduction.\par No crepitation. No instability.\par FANNIE painful. Impingement (FADIR) painful. Stinchfield painless.\par Abductor power 5-/5. Flexor power 3-/5. [de-identified] : No new imaging was reviewed at this time. [de-identified] : Ms. DELCID is a 49 year old F doing well s/p left THR who is now interested in right THR. Patient has no precautions on the physical motions she can do with her left hip. I showed her some stretches she can do to regain left hip ROM. Patient is okay to do water exercises. I encouraged her to continue with physical therapy exercises to gain flexibility prior to right THR. \par \par Patient was indicated for right total hip replacement. The patient gave informed consent for the surgical procedure and was instructed to speak to my surgical coordinator to arrange the logistics of surgical scheduling, presurgical testing, and medical optimization and clearance.

## 2019-07-08 ENCOUNTER — MEDICATION RENEWAL (OUTPATIENT)
Age: 49
End: 2019-07-08

## 2019-08-06 ENCOUNTER — FORM ENCOUNTER (OUTPATIENT)
Age: 49
End: 2019-08-06

## 2019-08-27 ENCOUNTER — OTHER (OUTPATIENT)
Age: 49
End: 2019-08-27

## 2019-09-08 ENCOUNTER — FORM ENCOUNTER (OUTPATIENT)
Age: 49
End: 2019-09-08

## 2019-09-09 ENCOUNTER — OUTPATIENT (OUTPATIENT)
Dept: OUTPATIENT SERVICES | Facility: HOSPITAL | Age: 49
LOS: 1 days | End: 2019-09-09
Payer: COMMERCIAL

## 2019-09-09 ENCOUNTER — APPOINTMENT (OUTPATIENT)
Dept: CT IMAGING | Facility: HOSPITAL | Age: 49
End: 2019-09-09
Payer: MEDICAID

## 2019-09-09 ENCOUNTER — OUTPATIENT (OUTPATIENT)
Dept: OUTPATIENT SERVICES | Facility: HOSPITAL | Age: 49
LOS: 1 days | End: 2019-09-09

## 2019-09-09 DIAGNOSIS — Z01.818 ENCOUNTER FOR OTHER PREPROCEDURAL EXAMINATION: ICD-10-CM

## 2019-09-09 DIAGNOSIS — Z22.321 CARRIER OR SUSPECTED CARRIER OF METHICILLIN SUSCEPTIBLE STAPHYLOCOCCUS AUREUS: ICD-10-CM

## 2019-09-09 LAB
ANION GAP SERPL CALC-SCNC: 11 MMOL/L — SIGNIFICANT CHANGE UP (ref 5–17)
APPEARANCE UR: CLEAR — SIGNIFICANT CHANGE UP
APTT BLD: 38.7 SEC — HIGH (ref 27.5–36.3)
BACTERIA # UR AUTO: PRESENT /HPF
BILIRUB UR-MCNC: NEGATIVE — SIGNIFICANT CHANGE UP
BUN SERPL-MCNC: 15 MG/DL — SIGNIFICANT CHANGE UP (ref 7–23)
CALCIUM SERPL-MCNC: 10.2 MG/DL — SIGNIFICANT CHANGE UP (ref 8.4–10.5)
CHLORIDE SERPL-SCNC: 99 MMOL/L — SIGNIFICANT CHANGE UP (ref 96–108)
CO2 SERPL-SCNC: 28 MMOL/L — SIGNIFICANT CHANGE UP (ref 22–31)
COLOR SPEC: YELLOW — SIGNIFICANT CHANGE UP
CREAT SERPL-MCNC: 0.86 MG/DL — SIGNIFICANT CHANGE UP (ref 0.5–1.3)
DIFF PNL FLD: ABNORMAL
EPI CELLS # UR: ABNORMAL /HPF (ref 0–5)
GLUCOSE SERPL-MCNC: 79 MG/DL — SIGNIFICANT CHANGE UP (ref 70–99)
GLUCOSE UR QL: NEGATIVE — SIGNIFICANT CHANGE UP
HBA1C BLD-MCNC: 4.4 % — SIGNIFICANT CHANGE UP (ref 4–5.6)
HCT VFR BLD CALC: 36.5 % — SIGNIFICANT CHANGE UP (ref 34.5–45)
HGB BLD-MCNC: 11 G/DL — LOW (ref 11.5–15.5)
INR BLD: 1.35 — HIGH (ref 0.88–1.16)
KETONES UR-MCNC: NEGATIVE — SIGNIFICANT CHANGE UP
LEUKOCYTE ESTERASE UR-ACNC: NEGATIVE — SIGNIFICANT CHANGE UP
MCHC RBC-ENTMCNC: 28.5 PG — SIGNIFICANT CHANGE UP (ref 27–34)
MCHC RBC-ENTMCNC: 30.1 GM/DL — LOW (ref 32–36)
MCV RBC AUTO: 94.6 FL — SIGNIFICANT CHANGE UP (ref 80–100)
MRSA PCR RESULT.: NEGATIVE — SIGNIFICANT CHANGE UP
NITRITE UR-MCNC: NEGATIVE — SIGNIFICANT CHANGE UP
NRBC # BLD: 0 /100 WBCS — SIGNIFICANT CHANGE UP (ref 0–0)
PH UR: 5.5 — SIGNIFICANT CHANGE UP (ref 5–8)
PLATELET # BLD AUTO: 277 K/UL — SIGNIFICANT CHANGE UP (ref 150–400)
POTASSIUM SERPL-MCNC: 3.8 MMOL/L — SIGNIFICANT CHANGE UP (ref 3.5–5.3)
POTASSIUM SERPL-SCNC: 3.8 MMOL/L — SIGNIFICANT CHANGE UP (ref 3.5–5.3)
PROT UR-MCNC: NEGATIVE MG/DL — SIGNIFICANT CHANGE UP
PROTHROM AB SERPL-ACNC: 15.4 SEC — HIGH (ref 10–12.9)
RBC # BLD: 3.86 M/UL — SIGNIFICANT CHANGE UP (ref 3.8–5.2)
RBC # FLD: 14 % — SIGNIFICANT CHANGE UP (ref 10.3–14.5)
RBC CASTS # UR COMP ASSIST: < 5 /HPF — SIGNIFICANT CHANGE UP
S AUREUS DNA NOSE QL NAA+PROBE: NEGATIVE — SIGNIFICANT CHANGE UP
SODIUM SERPL-SCNC: 138 MMOL/L — SIGNIFICANT CHANGE UP (ref 135–145)
SP GR SPEC: 1.01 — SIGNIFICANT CHANGE UP (ref 1–1.03)
UROBILINOGEN FLD QL: 0.2 E.U./DL — SIGNIFICANT CHANGE UP
WBC # BLD: 5.96 K/UL — SIGNIFICANT CHANGE UP (ref 3.8–10.5)
WBC # FLD AUTO: 5.96 K/UL — SIGNIFICANT CHANGE UP (ref 3.8–10.5)
WBC UR QL: < 5 /HPF — SIGNIFICANT CHANGE UP

## 2019-09-09 PROCEDURE — 73700 CT LOWER EXTREMITY W/O DYE: CPT | Mod: 26,RT

## 2019-09-09 PROCEDURE — 93010 ELECTROCARDIOGRAM REPORT: CPT

## 2019-09-09 PROCEDURE — 93005 ELECTROCARDIOGRAM TRACING: CPT

## 2019-09-09 PROCEDURE — 87184 SC STD DISK METHOD PER PLATE: CPT

## 2019-09-09 PROCEDURE — 73700 CT LOWER EXTREMITY W/O DYE: CPT

## 2019-09-09 PROCEDURE — 85027 COMPLETE CBC AUTOMATED: CPT

## 2019-09-09 PROCEDURE — 80048 BASIC METABOLIC PNL TOTAL CA: CPT

## 2019-09-09 PROCEDURE — 85730 THROMBOPLASTIN TIME PARTIAL: CPT

## 2019-09-09 PROCEDURE — 81001 URINALYSIS AUTO W/SCOPE: CPT

## 2019-09-09 PROCEDURE — 87086 URINE CULTURE/COLONY COUNT: CPT

## 2019-09-09 PROCEDURE — 85610 PROTHROMBIN TIME: CPT

## 2019-09-09 PROCEDURE — 83036 HEMOGLOBIN GLYCOSYLATED A1C: CPT

## 2019-09-11 LAB
-  CLINDAMYCIN: SIGNIFICANT CHANGE UP
-  LEVOFLOXACIN: SIGNIFICANT CHANGE UP
-  PENICILLIN: SIGNIFICANT CHANGE UP
-  VANCOMYCIN: SIGNIFICANT CHANGE UP
METHOD TYPE: SIGNIFICANT CHANGE UP

## 2019-09-12 LAB
-  AMPICILLIN: SIGNIFICANT CHANGE UP
-  ERYTHROMYCIN: SIGNIFICANT CHANGE UP
ANABASINE UR-MCNC: <2 NG/ML — SIGNIFICANT CHANGE UP
COTININE UR-MCNC: <5 NG/ML — SIGNIFICANT CHANGE UP
CULTURE RESULTS: SIGNIFICANT CHANGE UP
NICOTINE UR-MCNC: <5 NG/ML — SIGNIFICANT CHANGE UP
NORNICOTINE UR-MCNC: <2 NG/ML — SIGNIFICANT CHANGE UP
ORGANISM # SPEC MICROSCOPIC CNT: SIGNIFICANT CHANGE UP
ORGANISM # SPEC MICROSCOPIC CNT: SIGNIFICANT CHANGE UP
SPECIMEN SOURCE: SIGNIFICANT CHANGE UP

## 2019-09-27 ENCOUNTER — APPOINTMENT (OUTPATIENT)
Dept: INTERNAL MEDICINE | Facility: CLINIC | Age: 49
End: 2019-09-27
Payer: MEDICAID

## 2019-09-27 VITALS
SYSTOLIC BLOOD PRESSURE: 140 MMHG | TEMPERATURE: 98.4 F | WEIGHT: 259 LBS | BODY MASS INDEX: 41.62 KG/M2 | HEIGHT: 66 IN | OXYGEN SATURATION: 98 % | HEART RATE: 71 BPM | DIASTOLIC BLOOD PRESSURE: 80 MMHG

## 2019-09-27 DIAGNOSIS — I48.0 PAROXYSMAL ATRIAL FIBRILLATION: ICD-10-CM

## 2019-09-27 DIAGNOSIS — Z01.818 ENCOUNTER FOR OTHER PREPROCEDURAL EXAMINATION: ICD-10-CM

## 2019-09-27 DIAGNOSIS — E66.9 OBESITY, UNSPECIFIED: ICD-10-CM

## 2019-09-27 DIAGNOSIS — D64.9 ANEMIA, UNSPECIFIED: ICD-10-CM

## 2019-09-27 PROCEDURE — 99214 OFFICE O/P EST MOD 30 MIN: CPT

## 2019-09-27 NOTE — HISTORY OF PRESENT ILLNESS
[Atrial Fibrillation] : atrial fibrillation [No Adverse Anesthesia Reaction] : no adverse anesthesia reaction in self or family member [No Pertinent Pulmonary History] : no history of asthma, COPD, sleep apnea, or smoking [Coronary Artery Disease] : no coronary artery disease [FreeTextEntry1] : Left THR [FreeTextEntry2] : 10/7/19 [FreeTextEntry3] : Dr Gleason [FreeTextEntry4] : Lifelong issue with hips.  She is s/p left THR with good result now planning on right THR\par AF on xarelto, dx'd 2 years ago.  \par HTN- on losartan/hctz 50/12.5 in the evening\par Anemia - post menopausal 5 years now, long hx of mild anemia \par Saw her cardiologist - last week\par UTI- recently treated by PMD.

## 2019-09-27 NOTE — PHYSICAL EXAM
[Normal Rate] : normal rate  [Regular Rhythm] : with a regular rhythm [No Edema] : there was no peripheral edema [Normal] : affect was normal and insight and judgment were intact [No Rash] : no rash [de-identified] : 3/6 catarina left sternal border

## 2019-09-27 NOTE — PLAN
[FreeTextEntry1] : \par 80,000 strep - urine - no infection, asymptomatic, skin lenin only- advise against tx with abx.\par Advised to stop celebrex as she is on xarelto and inc risk of bleeding\par Advised to stop xarelto 3d prior to surgery\par Advised to take losartan/hctz night before surgery

## 2019-10-04 VITALS
DIASTOLIC BLOOD PRESSURE: 84 MMHG | WEIGHT: 257.06 LBS | TEMPERATURE: 99 F | HEART RATE: 69 BPM | OXYGEN SATURATION: 98 % | HEIGHT: 66 IN | SYSTOLIC BLOOD PRESSURE: 142 MMHG | RESPIRATION RATE: 18 BRPM

## 2019-10-04 RX ORDER — CHLORHEXIDINE GLUCONATE 213 G/1000ML
1 SOLUTION TOPICAL ONCE
Refills: 0 | Status: DISCONTINUED | OUTPATIENT
Start: 2019-10-07 | End: 2019-10-10

## 2019-10-04 NOTE — H&P ADULT - NSHPPHYSICALEXAM_GEN_ALL_CORE
General: Alert and oriented, NAD  MSK:  Decreased ROM of left hip secondary to pain.  Remainder of PE as per medical clearance General: Alert and oriented, NAD, 48 y/o female   MSK: Decreased ROM secondary to pain at the right hip   calves soft, nontender bilaterally   sensation intact to light touch bilateral upper/lower extremities  DP 2+,  brisk capillary refill  EHL/FHL/TA/GS 5/5 bilaterally     Remainder of PE as per medical clearance

## 2019-10-04 NOTE — H&P ADULT - NSICDXPASTMEDICALHX_GEN_ALL_CORE_FT
PAST MEDICAL HISTORY:  Afib     Depression     Herniated intervertebral disc of lumbar spine     HTN (hypertension)     Osteoarthritis bilateral hips    Sciatica, unspecified laterality

## 2019-10-04 NOTE — H&P ADULT - HISTORY OF PRESENT ILLNESS
48yo female co left hip pain x .   Patient presents for elective left total hip arthroplasty robotic assisted. 50yo female co right hip pain x since childhood, as she reports she was born with dislocated hips and has had pain, stiffness, limited mobility throughout her life. Patient denies history of surgery as a child for her hips, but reports recent left total hip replacement in May. Patient has failed conservative modalities for her right hip including activity modification, previous physical therapy, oral pain medication all with limited relief. She uses a rolling walker at home for assistance. She denies associated numbness, tingling throughout her lower extremities. Patient reports use of Xarelto for atrial fibrillation. She denies recent history of illness, fever, antibiotics. Following review of the risks and benefits of the procedure, patient presents for elective right total hip arthroplasty robotic assisted with Dr. Gleason today 10/7.

## 2019-10-04 NOTE — PATIENT PROFILE ADULT - LIVES WITH
Type 2 diabetes mellitus with hyperglycemia, without long-term current use of insulin Hypothyroidism Type 2 diabetes mellitus with hyperglycemia, without long-term current use of insulin significant other

## 2019-10-04 NOTE — H&P ADULT - PROBLEM SELECTOR PLAN 1
Admit to orthopedics.  Presents for elective L SINCERE  Medically optimized and cleared for surgery by Dr. Ho Admit to orthopedics.  Presents for elective Right total hip replacement with Dr. Gleason   Medically optimized and cleared for surgery by Dr. Ho

## 2019-10-04 NOTE — H&P ADULT - NSHPLABSRESULTS_GEN_ALL_CORE
Preop cbc, bmp, ua wnl reviewed by medical clearance.  pt/inr, ptt elevated pt on xarelto for afibb will repeat DOS  Nasal swab negative  preop cxr wnl reviewed by medical clearance  preop ekg NSR with pre supraventricular complexes reviewed by medical clearance

## 2019-10-04 NOTE — PRE-OP CHECKLIST - 1.
PT/PTT/INR checked PT/PTT/INR sent ,but enough blood collected, will report to anesthesia, PA is aware of it

## 2019-10-07 ENCOUNTER — RESULT REVIEW (OUTPATIENT)
Age: 49
End: 2019-10-07

## 2019-10-07 ENCOUNTER — INPATIENT (INPATIENT)
Facility: HOSPITAL | Age: 49
LOS: 2 days | Discharge: HOME HEALTH | DRG: 470 | End: 2019-10-10
Attending: ORTHOPAEDIC SURGERY | Admitting: ORTHOPAEDIC SURGERY
Payer: COMMERCIAL

## 2019-10-07 ENCOUNTER — APPOINTMENT (OUTPATIENT)
Dept: ORTHOPEDIC SURGERY | Facility: HOSPITAL | Age: 49
End: 2019-10-07

## 2019-10-07 DIAGNOSIS — M51.26 OTHER INTERVERTEBRAL DISC DISPLACEMENT, LUMBAR REGION: ICD-10-CM

## 2019-10-07 DIAGNOSIS — I10 ESSENTIAL (PRIMARY) HYPERTENSION: ICD-10-CM

## 2019-10-07 DIAGNOSIS — Z96.642 PRESENCE OF LEFT ARTIFICIAL HIP JOINT: Chronic | ICD-10-CM

## 2019-10-07 DIAGNOSIS — I48.91 UNSPECIFIED ATRIAL FIBRILLATION: ICD-10-CM

## 2019-10-07 DIAGNOSIS — M19.90 UNSPECIFIED OSTEOARTHRITIS, UNSPECIFIED SITE: ICD-10-CM

## 2019-10-07 DIAGNOSIS — F32.9 MAJOR DEPRESSIVE DISORDER, SINGLE EPISODE, UNSPECIFIED: ICD-10-CM

## 2019-10-07 LAB
ANION GAP SERPL CALC-SCNC: 11 MMOL/L — SIGNIFICANT CHANGE UP (ref 5–17)
BUN SERPL-MCNC: 11 MG/DL — SIGNIFICANT CHANGE UP (ref 7–23)
CALCIUM SERPL-MCNC: 9.1 MG/DL — SIGNIFICANT CHANGE UP (ref 8.4–10.5)
CHLORIDE SERPL-SCNC: 105 MMOL/L — SIGNIFICANT CHANGE UP (ref 96–108)
CO2 SERPL-SCNC: 23 MMOL/L — SIGNIFICANT CHANGE UP (ref 22–31)
CREAT SERPL-MCNC: 0.72 MG/DL — SIGNIFICANT CHANGE UP (ref 0.5–1.3)
GLUCOSE SERPL-MCNC: 146 MG/DL — HIGH (ref 70–99)
POTASSIUM SERPL-MCNC: SIGNIFICANT CHANGE UP MMOL/L (ref 3.5–5.3)
POTASSIUM SERPL-SCNC: SIGNIFICANT CHANGE UP MMOL/L (ref 3.5–5.3)
SODIUM SERPL-SCNC: 139 MMOL/L — SIGNIFICANT CHANGE UP (ref 135–145)
TROPONIN T SERPL-MCNC: <0.01 NG/ML — SIGNIFICANT CHANGE UP (ref 0–0.01)

## 2019-10-07 PROCEDURE — 99223 1ST HOSP IP/OBS HIGH 75: CPT

## 2019-10-07 PROCEDURE — 27130 TOTAL HIP ARTHROPLASTY: CPT | Mod: RT

## 2019-10-07 PROCEDURE — 72170 X-RAY EXAM OF PELVIS: CPT | Mod: 26

## 2019-10-07 RX ORDER — ACETAMINOPHEN 500 MG
650 TABLET ORAL EVERY 6 HOURS
Refills: 0 | Status: COMPLETED | OUTPATIENT
Start: 2019-10-07 | End: 2019-10-10

## 2019-10-07 RX ORDER — OXYCODONE HYDROCHLORIDE 5 MG/1
10 TABLET ORAL EVERY 4 HOURS
Refills: 0 | Status: DISCONTINUED | OUTPATIENT
Start: 2019-10-07 | End: 2019-10-08

## 2019-10-07 RX ORDER — POLYETHYLENE GLYCOL 3350 17 G/17G
17 POWDER, FOR SOLUTION ORAL DAILY
Refills: 0 | Status: DISCONTINUED | OUTPATIENT
Start: 2019-10-07 | End: 2019-10-10

## 2019-10-07 RX ORDER — ONDANSETRON 8 MG/1
4 TABLET, FILM COATED ORAL EVERY 6 HOURS
Refills: 0 | Status: DISCONTINUED | OUTPATIENT
Start: 2019-10-07 | End: 2019-10-10

## 2019-10-07 RX ORDER — FERROUS SULFATE 325(65) MG
1 TABLET ORAL
Qty: 0 | Refills: 0 | DISCHARGE

## 2019-10-07 RX ORDER — DOCUSATE SODIUM 100 MG
100 CAPSULE ORAL THREE TIMES A DAY
Refills: 0 | Status: DISCONTINUED | OUTPATIENT
Start: 2019-10-07 | End: 2019-10-10

## 2019-10-07 RX ORDER — CELECOXIB 200 MG/1
400 CAPSULE ORAL ONCE
Refills: 0 | Status: COMPLETED | OUTPATIENT
Start: 2019-10-07 | End: 2019-10-07

## 2019-10-07 RX ORDER — MAGNESIUM HYDROXIDE 400 MG/1
30 TABLET, CHEWABLE ORAL DAILY
Refills: 0 | Status: DISCONTINUED | OUTPATIENT
Start: 2019-10-07 | End: 2019-10-10

## 2019-10-07 RX ORDER — CEFAZOLIN SODIUM 1 G
2000 VIAL (EA) INJECTION EVERY 8 HOURS
Refills: 0 | Status: COMPLETED | OUTPATIENT
Start: 2019-10-07 | End: 2019-10-08

## 2019-10-07 RX ORDER — INFLUENZA VIRUS VACCINE 15; 15; 15; 15 UG/.5ML; UG/.5ML; UG/.5ML; UG/.5ML
0.5 SUSPENSION INTRAMUSCULAR ONCE
Refills: 0 | Status: DISCONTINUED | OUTPATIENT
Start: 2019-10-07 | End: 2019-10-10

## 2019-10-07 RX ORDER — LOSARTAN POTASSIUM 100 MG/1
50 TABLET, FILM COATED ORAL DAILY
Refills: 0 | Status: DISCONTINUED | OUTPATIENT
Start: 2019-10-07 | End: 2019-10-09

## 2019-10-07 RX ORDER — APREPITANT 80 MG/1
40 CAPSULE ORAL ONCE
Refills: 0 | Status: COMPLETED | OUTPATIENT
Start: 2019-10-07 | End: 2019-10-07

## 2019-10-07 RX ORDER — ENOXAPARIN SODIUM 100 MG/ML
40 INJECTION SUBCUTANEOUS EVERY 12 HOURS
Refills: 0 | Status: DISCONTINUED | OUTPATIENT
Start: 2019-10-07 | End: 2019-10-10

## 2019-10-07 RX ORDER — MORPHINE SULFATE 50 MG/1
2 CAPSULE, EXTENDED RELEASE ORAL
Refills: 0 | Status: DISCONTINUED | OUTPATIENT
Start: 2019-10-07 | End: 2019-10-08

## 2019-10-07 RX ORDER — SENNA PLUS 8.6 MG/1
2 TABLET ORAL AT BEDTIME
Refills: 0 | Status: DISCONTINUED | OUTPATIENT
Start: 2019-10-07 | End: 2019-10-10

## 2019-10-07 RX ORDER — CEFAZOLIN SODIUM 1 G
2000 VIAL (EA) INJECTION EVERY 8 HOURS
Refills: 0 | Status: DISCONTINUED | OUTPATIENT
Start: 2019-10-07 | End: 2019-10-07

## 2019-10-07 RX ORDER — METOPROLOL TARTRATE 50 MG
12.5 TABLET ORAL
Refills: 0 | Status: DISCONTINUED | OUTPATIENT
Start: 2019-10-07 | End: 2019-10-10

## 2019-10-07 RX ORDER — BUPIVACAINE 13.3 MG/ML
20 INJECTION, SUSPENSION, LIPOSOMAL INFILTRATION ONCE
Refills: 0 | Status: DISCONTINUED | OUTPATIENT
Start: 2019-10-07 | End: 2019-10-10

## 2019-10-07 RX ORDER — OXYCODONE HYDROCHLORIDE 5 MG/1
5 TABLET ORAL EVERY 4 HOURS
Refills: 0 | Status: DISCONTINUED | OUTPATIENT
Start: 2019-10-07 | End: 2019-10-08

## 2019-10-07 RX ORDER — LOSARTAN/HYDROCHLOROTHIAZIDE 100MG-25MG
1 TABLET ORAL
Qty: 0 | Refills: 0 | DISCHARGE

## 2019-10-07 RX ORDER — CYCLOBENZAPRINE HYDROCHLORIDE 10 MG/1
1 TABLET, FILM COATED ORAL
Qty: 0 | Refills: 0 | DISCHARGE

## 2019-10-07 RX ORDER — RIVAROXABAN 15 MG-20MG
1 KIT ORAL
Qty: 0 | Refills: 0 | DISCHARGE

## 2019-10-07 RX ORDER — SODIUM CHLORIDE 9 MG/ML
1000 INJECTION, SOLUTION INTRAVENOUS
Refills: 0 | Status: DISCONTINUED | OUTPATIENT
Start: 2019-10-07 | End: 2019-10-10

## 2019-10-07 RX ORDER — CELECOXIB 200 MG/1
100 CAPSULE ORAL EVERY 12 HOURS
Refills: 0 | Status: DISCONTINUED | OUTPATIENT
Start: 2019-10-07 | End: 2019-10-10

## 2019-10-07 RX ORDER — ACETAMINOPHEN 500 MG
1000 TABLET ORAL ONCE
Refills: 0 | Status: COMPLETED | OUTPATIENT
Start: 2019-10-07 | End: 2019-10-07

## 2019-10-07 RX ADMIN — CELECOXIB 100 MILLIGRAM(S): 200 CAPSULE ORAL at 18:20

## 2019-10-07 RX ADMIN — ONDANSETRON 4 MILLIGRAM(S): 8 TABLET, FILM COATED ORAL at 18:19

## 2019-10-07 RX ADMIN — CELECOXIB 100 MILLIGRAM(S): 200 CAPSULE ORAL at 19:20

## 2019-10-07 RX ADMIN — Medication 650 MILLIGRAM(S): at 18:20

## 2019-10-07 RX ADMIN — Medication 1000 MILLIGRAM(S): at 07:37

## 2019-10-07 RX ADMIN — CELECOXIB 400 MILLIGRAM(S): 200 CAPSULE ORAL at 07:36

## 2019-10-07 RX ADMIN — Medication 12.5 MILLIGRAM(S): at 14:38

## 2019-10-07 RX ADMIN — SODIUM CHLORIDE 100 MILLILITER(S): 9 INJECTION, SOLUTION INTRAVENOUS at 18:36

## 2019-10-07 RX ADMIN — MORPHINE SULFATE 2 MILLIGRAM(S): 50 CAPSULE, EXTENDED RELEASE ORAL at 12:42

## 2019-10-07 RX ADMIN — Medication 100 MILLIGRAM(S): at 20:59

## 2019-10-07 RX ADMIN — Medication 650 MILLIGRAM(S): at 19:20

## 2019-10-07 RX ADMIN — OXYCODONE HYDROCHLORIDE 10 MILLIGRAM(S): 5 TABLET ORAL at 20:58

## 2019-10-07 RX ADMIN — LOSARTAN POTASSIUM 50 MILLIGRAM(S): 100 TABLET, FILM COATED ORAL at 18:36

## 2019-10-07 RX ADMIN — OXYCODONE HYDROCHLORIDE 10 MILLIGRAM(S): 5 TABLET ORAL at 22:51

## 2019-10-07 RX ADMIN — MORPHINE SULFATE 2 MILLIGRAM(S): 50 CAPSULE, EXTENDED RELEASE ORAL at 12:17

## 2019-10-07 RX ADMIN — Medication 2000 MILLIGRAM(S): at 18:20

## 2019-10-07 RX ADMIN — APREPITANT 40 MILLIGRAM(S): 80 CAPSULE ORAL at 07:37

## 2019-10-07 NOTE — CONSULT NOTE ADULT - ATTENDING COMMENTS
Assessment: Patient personally seen and examined myself during rounds with the Fellow  ON DATE 10/7/19 PM rounds  Note read, including vitals, physical findings, laboratory data, and radiological reports.   Agree with above with revisions, if any included below.   - As Above  - Plan of care: continuous telemetry monitoring, frequent hemodynamic checks, daily 12 Lead ECG, add K and Mg to labs, consider  EP consultation if intervention is needed.

## 2019-10-07 NOTE — PROGRESS NOTE ADULT - SUBJECTIVE AND OBJECTIVE BOX
Orthopaedics Post Op Check    Procedure: right total hip replacement   Surgeon: Dr. Gleason     Pt comfortable, without complaints  Denies CP, SOB, N/V, numbness/tingling   Cardiology consulted and examined patient in PACU 2/2 intra op afib. Patient stable. Known afib.    Vital Signs Last 24 Hrs  T(C): 36.3 (07 Oct 2019 11:35), Max: 36.3 (07 Oct 2019 11:35)  T(F): 97.4 (07 Oct 2019 11:35), Max: 97.4 (07 Oct 2019 11:35)  HR: 92 (07 Oct 2019 13:35) (78 - 110)  BP: 131/74 (07 Oct 2019 13:35) (109/59 - 135/79)  BP(mean): 95 (07 Oct 2019 13:35) (84 - 101)  RR: 13 (07 Oct 2019 13:35) (13 - 21)  SpO2: 100% (07 Oct 2019 13:35) (98% - 100%)  AVSS, NAD    Dressing C/D/I  General: Pt Alert and oriented     Pulses: DP pulses 2+   Sensation: SLT in tact to distal bilateral lower extremities   Motor: EHL/FHL/TA/GS 5/5 motor strength bilaterally     10-07    139  |  105  |  11  ----------------------------<  146<H>  See Note   |  23  |  0.72    Ca    9.1      07 Oct 2019 12:36      Post op XR: right hip prosthesis in place     A/P: 49yFemale POD#0 s/p right THR   - Stable  - Pain Control  - DVT ppx: Lovenox   - Post op abx: Ancef  - PT, WBS: WBAT  - F/U AM Labs

## 2019-10-07 NOTE — CONSULT NOTE ADULT - ASSESSMENT
49 F w/ pmhx of paroxysmal atrial fibrillation admitted with right hip pain s/p hip replacement. Cardiology consulted for intra-op atrial fibrillation.    #Paroxysmal Atrial Fibrillation: rate controlled. A-Fib known, on home Xarleto, held for procedure   - can re-start anticoagulation per primary team   - can start metoprolol tartrate 12.5mg BID  - please obtain echocardiogram     Discussed w/ attending

## 2019-10-07 NOTE — CONSULT NOTE ADULT - SUBJECTIVE AND OBJECTIVE BOX
HPI:  49 F w/ pmhx of paroxysmal atrial fibrillation admitted with right hip pain s/p hip replacement. Cardiology consulted for intra-op atrial fibrillation. Patient with known a-fib, on Xarelto Reports being prescribed Multaq, however patient never started medication. Patient feels well, without chest pain or SOB. Otherwise asymptomatic, resting comfortably. Denies any invasive cardiac procedures. No stents. No tobacco use or alcohol Last dose Xarelto Oct 3rd.       CARDIAC DIAGNOSTIC TESTING:      TELEMETRY: a-fib    ECG: a-fib     ECHO  FINDINGS: pending       PAST MEDICAL & SURGICAL HISTORY:  Sciatica, unspecified laterality  Herniated intervertebral disc of lumbar spine  HTN (hypertension)  Depression  Afib  Osteoarthritis: bilateral hips  History of total hip replacement, left      HOME MEDICATIONS  T(C): 36.3 (10-07-19 @ 11:35), Max: 36.3 (10-07-19 @ 11:35)  HR: 96 (10-07-19 @ 12:40) (78 - 110)  BP: 135/79 (10-07-19 @ 12:35) (109/59 - 135/79)  RR: 19 (10-07-19 @ 12:40) (16 - 21)  SpO2: 100% (10-07-19 @ 12:40) (98% - 100%)    MEDICATIONS  (STANDING):  BUpivacaine liposome 1.3% Injectable (no eMAR) 20 milliLiter(s) Local Injection once  chlorhexidine 2% Cloths 1 Application(s) Topical once  influenza   Vaccine 0.5 milliLiter(s) IntraMuscular once  lactated ringers. 1000 milliLiter(s) (100 mL/Hr) IV Continuous <Continuous>    MEDICATIONS  (PRN):  morphine  - Injectable 2 milliGRAM(s) IV Push every 15 minutes PRN Severe Pain (7 - 10)      SOCIAL HISTORY:  - Smoking: no  - Alcohol: no       REVIEW OF SYSTEMS:  CONSTITUTIONAL: No fever, weight loss, or fatigue  EYES: No eye pain, visual disturbances, or discharge  ENMT:  No difficulty hearing, tinnitus, vertigo; No sinus or throat pain  NECK: No pain or stiffness  RESPIRATORY: No cough, wheezing, chills or hemoptysis; No Shortness of Breath  CARDIOVASCULAR: No chest pain, palpitations, passing out, dizziness, or leg swelling  GASTROINTESTINAL: No abdominal or epigastric pain. No nausea, vomiting, or hematemesis; No diarrhea or constipation. No melena or hematochezia.  GENITOURINARY: No dysuria, frequency, hematuria, or incontinence  NEUROLOGICAL: No headaches, memory loss, loss of strength, numbness, or tremors  SKIN: No itching, burning, rashes, or lesions   LYMPH Nodes: No enlarged glands  ENDOCRINE: No heat or cold intolerance; No hair loss  MUSCULOSKELETAL: No joint pain or swelling; No muscle, back, or extremity pain  PSYCHIATRIC: No depression, anxiety, mood swings, or difficulty sleeping  HEME/LYMPH: No easy bruising, or bleeding gums  ALLERY AND IMMUNOLOGIC: No hives or eczema    Vitals past 24 Hours: T(C): 36.3 (10-07-19 @ 11:35), Max: 36.3 (10-07-19 @ 11:35)  HR: 96 (10-07-19 @ 12:40) (78 - 110)  BP: 135/79 (10-07-19 @ 12:35) (109/59 - 135/79)  RR: 19 (10-07-19 @ 12:40) (16 - 21)  SpO2: 100% (10-07-19 @ 12:40) (98% - 100%)	    PHYSICAL EXAM:  GEN: No acute respiratory distress, appears stated age	  HEENT: Anicteric sclera, PERRL, EOMI, no oropharyngeal erythema or discharge, trachea midline  Lymphatic: No cervical lymphadenopathy  Cardiovascular: S1 S2, No JVD, No murmurs, PMI  Respiratory: Lungs clear to auscultation, no rrw  Gastrointestinal:  BS+, soft, non distended, non tender, no HSM  Skin: No rashes, No ecchymoses, No cyanosis  Neurologic: Non-focal, AAO x 3, strength grossly normal in all extremeties  Extremities: Normal range of motion, No clubbing, cyanosis or edema  Vascular: Radial 2+, DP 2+      I&O's Detail      LABS:                  I&O's Summary      RADIOLOGY & ADDITIONAL STUDIES:

## 2019-10-08 ENCOUNTER — TRANSCRIPTION ENCOUNTER (OUTPATIENT)
Age: 49
End: 2019-10-08

## 2019-10-08 LAB
ANION GAP SERPL CALC-SCNC: 9 MMOL/L — SIGNIFICANT CHANGE UP (ref 5–17)
APTT BLD: 28.1 SEC — SIGNIFICANT CHANGE UP (ref 27.5–36.3)
BUN SERPL-MCNC: 13 MG/DL — SIGNIFICANT CHANGE UP (ref 7–23)
CALCIUM SERPL-MCNC: 8.9 MG/DL — SIGNIFICANT CHANGE UP (ref 8.4–10.5)
CHLORIDE SERPL-SCNC: 104 MMOL/L — SIGNIFICANT CHANGE UP (ref 96–108)
CO2 SERPL-SCNC: 25 MMOL/L — SIGNIFICANT CHANGE UP (ref 22–31)
CREAT SERPL-MCNC: 0.94 MG/DL — SIGNIFICANT CHANGE UP (ref 0.5–1.3)
GLUCOSE SERPL-MCNC: 128 MG/DL — HIGH (ref 70–99)
HCT VFR BLD CALC: 28.7 % — LOW (ref 34.5–45)
HGB BLD-MCNC: 9 G/DL — LOW (ref 11.5–15.5)
INR BLD: 1.21 — HIGH (ref 0.88–1.16)
MCHC RBC-ENTMCNC: 30.4 PG — SIGNIFICANT CHANGE UP (ref 27–34)
MCHC RBC-ENTMCNC: 31.4 GM/DL — LOW (ref 32–36)
MCV RBC AUTO: 97 FL — SIGNIFICANT CHANGE UP (ref 80–100)
NRBC # BLD: 0 /100 WBCS — SIGNIFICANT CHANGE UP (ref 0–0)
PLATELET # BLD AUTO: 208 K/UL — SIGNIFICANT CHANGE UP (ref 150–400)
POTASSIUM SERPL-MCNC: 4 MMOL/L — SIGNIFICANT CHANGE UP (ref 3.5–5.3)
POTASSIUM SERPL-SCNC: 4 MMOL/L — SIGNIFICANT CHANGE UP (ref 3.5–5.3)
PROTHROM AB SERPL-ACNC: 13.7 SEC — HIGH (ref 10–12.9)
RBC # BLD: 2.96 M/UL — LOW (ref 3.8–5.2)
RBC # FLD: 13.8 % — SIGNIFICANT CHANGE UP (ref 10.3–14.5)
SODIUM SERPL-SCNC: 138 MMOL/L — SIGNIFICANT CHANGE UP (ref 135–145)
WBC # BLD: 11.57 K/UL — HIGH (ref 3.8–10.5)
WBC # FLD AUTO: 11.57 K/UL — HIGH (ref 3.8–10.5)

## 2019-10-08 PROCEDURE — 99233 SBSQ HOSP IP/OBS HIGH 50: CPT | Mod: 25

## 2019-10-08 PROCEDURE — 99252 IP/OBS CONSLTJ NEW/EST SF 35: CPT

## 2019-10-08 PROCEDURE — 93306 TTE W/DOPPLER COMPLETE: CPT | Mod: 26

## 2019-10-08 RX ORDER — SENNA PLUS 8.6 MG/1
2 TABLET ORAL
Qty: 0 | Refills: 0 | DISCHARGE
Start: 2019-10-08

## 2019-10-08 RX ORDER — OXYCODONE HYDROCHLORIDE 5 MG/1
5 TABLET ORAL EVERY 4 HOURS
Refills: 0 | Status: DISCONTINUED | OUTPATIENT
Start: 2019-10-08 | End: 2019-10-10

## 2019-10-08 RX ORDER — CELECOXIB 200 MG/1
0 CAPSULE ORAL
Qty: 0 | Refills: 0 | DISCHARGE

## 2019-10-08 RX ORDER — OXYCODONE HYDROCHLORIDE 5 MG/1
10 TABLET ORAL EVERY 4 HOURS
Refills: 0 | Status: DISCONTINUED | OUTPATIENT
Start: 2019-10-08 | End: 2019-10-10

## 2019-10-08 RX ORDER — DOCUSATE SODIUM 100 MG
1 CAPSULE ORAL
Qty: 0 | Refills: 0 | DISCHARGE
Start: 2019-10-08

## 2019-10-08 RX ORDER — POLYETHYLENE GLYCOL 3350 17 G/17G
17 POWDER, FOR SOLUTION ORAL
Qty: 0 | Refills: 0 | DISCHARGE
Start: 2019-10-08

## 2019-10-08 RX ORDER — CELECOXIB 200 MG/1
1 CAPSULE ORAL
Qty: 0 | Refills: 0 | DISCHARGE
Start: 2019-10-08

## 2019-10-08 RX ORDER — KETOROLAC TROMETHAMINE 30 MG/ML
15 SYRINGE (ML) INJECTION EVERY 6 HOURS
Refills: 0 | Status: DISCONTINUED | OUTPATIENT
Start: 2019-10-08 | End: 2019-10-08

## 2019-10-08 RX ORDER — ACETAMINOPHEN 500 MG
2 TABLET ORAL
Qty: 0 | Refills: 0 | DISCHARGE
Start: 2019-10-08

## 2019-10-08 RX ADMIN — Medication 650 MILLIGRAM(S): at 21:46

## 2019-10-08 RX ADMIN — MORPHINE SULFATE 2 MILLIGRAM(S): 50 CAPSULE, EXTENDED RELEASE ORAL at 02:15

## 2019-10-08 RX ADMIN — Medication 100 MILLIGRAM(S): at 06:33

## 2019-10-08 RX ADMIN — ENOXAPARIN SODIUM 40 MILLIGRAM(S): 100 INJECTION SUBCUTANEOUS at 17:59

## 2019-10-08 RX ADMIN — Medication 12.5 MILLIGRAM(S): at 18:00

## 2019-10-08 RX ADMIN — Medication 650 MILLIGRAM(S): at 00:06

## 2019-10-08 RX ADMIN — ENOXAPARIN SODIUM 40 MILLIGRAM(S): 100 INJECTION SUBCUTANEOUS at 06:32

## 2019-10-08 RX ADMIN — Medication 650 MILLIGRAM(S): at 11:00

## 2019-10-08 RX ADMIN — POLYETHYLENE GLYCOL 3350 17 GRAM(S): 17 POWDER, FOR SOLUTION ORAL at 11:02

## 2019-10-08 RX ADMIN — Medication 650 MILLIGRAM(S): at 21:51

## 2019-10-08 RX ADMIN — Medication 650 MILLIGRAM(S): at 00:07

## 2019-10-08 RX ADMIN — Medication 650 MILLIGRAM(S): at 18:04

## 2019-10-08 RX ADMIN — CELECOXIB 100 MILLIGRAM(S): 200 CAPSULE ORAL at 06:33

## 2019-10-08 RX ADMIN — OXYCODONE HYDROCHLORIDE 10 MILLIGRAM(S): 5 TABLET ORAL at 14:45

## 2019-10-08 RX ADMIN — MORPHINE SULFATE 2 MILLIGRAM(S): 50 CAPSULE, EXTENDED RELEASE ORAL at 01:54

## 2019-10-08 RX ADMIN — Medication 100 MILLIGRAM(S): at 14:45

## 2019-10-08 RX ADMIN — Medication 650 MILLIGRAM(S): at 06:33

## 2019-10-08 RX ADMIN — OXYCODONE HYDROCHLORIDE 10 MILLIGRAM(S): 5 TABLET ORAL at 15:00

## 2019-10-08 RX ADMIN — OXYCODONE HYDROCHLORIDE 5 MILLIGRAM(S): 5 TABLET ORAL at 21:47

## 2019-10-08 RX ADMIN — Medication 100 MILLIGRAM(S): at 21:46

## 2019-10-08 RX ADMIN — CELECOXIB 100 MILLIGRAM(S): 200 CAPSULE ORAL at 18:00

## 2019-10-08 RX ADMIN — OXYCODONE HYDROCHLORIDE 10 MILLIGRAM(S): 5 TABLET ORAL at 11:00

## 2019-10-08 RX ADMIN — CELECOXIB 100 MILLIGRAM(S): 200 CAPSULE ORAL at 18:04

## 2019-10-08 RX ADMIN — OXYCODONE HYDROCHLORIDE 5 MILLIGRAM(S): 5 TABLET ORAL at 22:00

## 2019-10-08 RX ADMIN — Medication 650 MILLIGRAM(S): at 12:20

## 2019-10-08 RX ADMIN — OXYCODONE HYDROCHLORIDE 10 MILLIGRAM(S): 5 TABLET ORAL at 11:45

## 2019-10-08 RX ADMIN — Medication 650 MILLIGRAM(S): at 18:00

## 2019-10-08 RX ADMIN — Medication 2000 MILLIGRAM(S): at 01:54

## 2019-10-08 NOTE — PHYSICAL THERAPY INITIAL EVALUATION ADULT - GENERAL OBSERVATIONS, REHAB EVAL
Patient encountered supine in bed, NAD, +CB, surgical site C/D/I, discussed with CANDIDA Granado prior to beginning PT session, +SCD's b/l, + Patient encountered supine in bed, NAD, +CB, surgical site C/D/I, discussed with CANDIDA Granado prior to beginning PT session, +SCD's b/l, +EKG, VSS.

## 2019-10-08 NOTE — DISCHARGE NOTE PROVIDER - CARE PROVIDER_API CALL
Rufino Gleason)  Orthopaedic Surgery  [address], [address] [place] [address]  Phone: [phone]  Fax: [phone]  Follow Up Time: 2 weeks

## 2019-10-08 NOTE — PHYSICAL THERAPY INITIAL EVALUATION ADULT - IMPAIRMENTS FOUND, PT EVAL
posture/ergonomics and body mechanics/gait, locomotion, and balance/aerobic capacity/endurance/muscle strength

## 2019-10-08 NOTE — DISCHARGE NOTE PROVIDER - HOSPITAL COURSE
Admitted    Surgery: Right total hip replacement 10/7/19    Desiree-op Antibiotics    Pain control    DVT prophylaxis    OOB/Physical Therapy

## 2019-10-08 NOTE — PHYSICAL THERAPY INITIAL EVALUATION ADULT - PLANNED THERAPY INTERVENTIONS, PT EVAL
balance training/bed mobility training/strengthening/transfer training/gait training/postural re-education

## 2019-10-08 NOTE — PROGRESS NOTE ADULT - SUBJECTIVE AND OBJECTIVE BOX
Ortho Note    Patient seen and examined earlier this AM  Pt comfortable without complaints, pain controlled  Denies CP, SOB, N/V, numbness/tingling   Patient with postop episode of afib in PACU with cards consulted w recommended starting Metoprolol and obtaining echo    Vital Signs Last 24 Hrs  T(C): 37.3 (08 Oct 2019 16:45), Max: 37.3 (08 Oct 2019 16:45)  T(F): 99.2 (08 Oct 2019 16:45), Max: 99.2 (08 Oct 2019 16:45)  HR: 65 (08 Oct 2019 16:45) (59 - 76)  BP: 125/58 (08 Oct 2019 16:45) (86/50 - 154/73)  BP(mean): --  RR: 17 (08 Oct 2019 16:45) (15 - 18)  SpO2: 98% (08 Oct 2019 16:45) (96% - 100%)    VSS  General: A&Ox3, NAD  RLE: Aquacell DSG C/D/I  Pulses: Foot WWP; DP pulse 2+; Cap refill < 2 sec  Sensation: SILT distally and symmetric to contralateral extremity  Motor: TA/EHL/FHL/GS 5/5 and symmetric to contralateral extremity                          9.0    11.57 )-----------( 208      ( 08 Oct 2019 06:27 )             28.7     08 Oct 2019 06:27    138    |  104    |  13     ----------------------------<  128    4.0     |  25     |  0.94

## 2019-10-08 NOTE — CONSULT NOTE ADULT - SUBJECTIVE AND OBJECTIVE BOX
Patient seen and examined, Chart reviewed    HPI:  48 yo female with right hip pain since childhood, as she reports she was born with dislocated hips and has had pain, stiffness, limited mobility throughout her life. Patient denies history of surgery as a child for her hips, but reports recent left total hip replacement in May. Patient has failed conservative modalities for her right hip including activity modification, previous physical therapy, oral pain medication all with limited relief. She uses a rolling walker at home for assistance. She denies associated numbness, tingling throughout her lower extremities. Patient reports use of Xarelto for atrial fibrillation. She denies recent history of illness, fever, antibiotics.  She is today POD #1 from an elective right THR  BP lower this AM post admin of morphine.  Pt. is asymptomatic.     PAST MEDICAL & SURGICAL HISTORY:  Sciatica, unspecified laterality  Herniated intervertebral disc of lumbar spine  HTN (hypertension)  Depression  Afib  Osteoarthritis: bilateral hips  History of total hip replacement, left      REVIEW OF SYSTEMS:  CONSTITUTIONAL:  No night sweats.  No fatigue,  No fever or chills.  HEENT:  Eyes:  No visual changes.  No eye pain.    ENT:    No sinus pain.  No sore throat.    RESPIRATORY:  No cough.  No wheeze.    No shortness of breath.  CARDIOVASCULAR:  No chest pains.  No palpitations.  GASTROINTESTINAL:  No abdominal pain.  No nausea or vomiting.  No diarrhea   GENITOURINARY:  No urgency.  No frequency.  No dysuria.  .    MUSCULOSKELETAL:  right hip pain present  SKIN:  No rashes.      acetaminophen   Tablet .. 650 milliGRAM(s) Oral every 6 hours  aluminum hydroxide/magnesium hydroxide/simethicone Suspension 30 milliLiter(s) Oral four times a day PRN  BUpivacaine liposome 1.3% Injectable (no eMAR) 20 milliLiter(s) Local Injection once  celecoxib 100 milliGRAM(s) Oral every 12 hours  chlorhexidine 2% Cloths 1 Application(s) Topical once  docusate sodium 100 milliGRAM(s) Oral three times a day  enoxaparin Injectable 40 milliGRAM(s) SubCutaneous every 12 hours  influenza   Vaccine 0.5 milliLiter(s) IntraMuscular once  lactated ringers. 1000 milliLiter(s) IV Continuous <Continuous>  losartan 50 milliGRAM(s) Oral daily  magnesium hydroxide Suspension 30 milliLiter(s) Oral daily PRN  metoprolol tartrate 12.5 milliGRAM(s) Oral two times a day  morphine  - Injectable 2 milliGRAM(s) IV Push every 3 hours PRN  morphine  - Injectable 2 milliGRAM(s) IV Push every 15 minutes PRN  ondansetron Injectable 4 milliGRAM(s) IV Push every 6 hours PRN  oxyCODONE    IR 5 milliGRAM(s) Oral every 4 hours PRN  oxyCODONE    IR 10 milliGRAM(s) Oral every 4 hours PRN  polyethylene glycol 3350 17 Gram(s) Oral daily  senna 2 Tablet(s) Oral at bedtime PRN      Allergies    penicillin (Hives      SOCIAL HISTORY:  no tob  FAMILY HISTORY:  nc    Vital Signs Last 24 Hrs  T(C): 36.6 (08 Oct 2019 05:56), Max: 37.1 (07 Oct 2019 17:47)  T(F): 97.9 (08 Oct 2019 05:56), Max: 98.7 (07 Oct 2019 17:47)  HR: 65 (08 Oct 2019 05:56) (63 - 129)  BP: 86/50 (08 Oct 2019 05:56) (86/50 - 154/73)  BP(mean): 95 (07 Oct 2019 13:35) (84 - 101)  RR: 17 (08 Oct 2019 05:56) (13 - 22)  SpO2: 98% (08 Oct 2019 05:56) (97% - 100%)    10-07 @ 07:01  -  10-08 @ 07:00  --------------------------------------------------------  IN: 950 mL / OUT: 1100 mL / NET: -150 mL        PHYSICAL EXAM:   General - NAD, Alert and oriented x 3,   Eyes - PERRLA, EOM intact  Neck - - No lymphadenopathy  Cardiovascular - RRR no m/r/g, no JVD  Lungs - Clear to auscutation, no use of acessory muscles, no crackles or wheezes.  Skin - No rashes, skin warm and dry, no erythematous areas  Abdomen - Normal bowel sounds, abdomen soft and nontender  Extremeties - No edema,   Neurological – no focal deficits      LABS:                        9.0    11.57 )-----------( 208      ( 08 Oct 2019 06:27 )             28.7     10-08    138  |  104  |  13  ----------------------------<  128<H>  4.0   |  25  |  0.94    Ca    8.9      08 Oct 2019 06:27      PT/INR - ( 08 Oct 2019 06:27 )   PT: 13.7 sec;   INR: 1.21          PTT - ( 08 Oct 2019 06:27 )  PTT:28.1 sec      RADIOLOGY & ADDITIONAL STUDIES:

## 2019-10-08 NOTE — DISCHARGE NOTE PROVIDER - NSDCFUADDINST_GEN_ALL_CORE_FT
**See attached instructions from Dr. Gleason**  Weight bear as tolerated with assistive device  No strenuous activity, heavy lifting, driving or returning to work until cleared by MD.  You may shower - dressing is water-resistant, no soaking in bathtubs.  Remove dressing after post op day 5-7, then leave incision open to air. Keep incision clean and dry.  Try to have regular bowel movements, take stool softener or laxative if necessary.  May take Pepcid or Zantac for upset stomach.  Swelling may travel all the way down leg to foot, this is normal and will subside in a few weeks.  Call to schedule an appt with Dr. Gleason for follow up, if you have staples or sutures they will be removed in office.  Contact your doctor if you experience: fever greater than 101.5, chills, chest pain, difficulty breathing, redness or excessive drainage around the incision, other concerns.  Follow up with your primary care provider. **Please see Dr. Gleason's separate discharge instructions sheet. Your medications were sent to [facility], located on the first floor of [facility]. Take medications as prescribed.        Weight bear as tolerated with assistive device  No strenuous activity, heavy lifting, driving or returning to work until cleared by MD.  You may shower - dressing is water-resistant, no soaking in bathtubs.  Remove dressing after post op day 5-7, then leave incision open to air. Keep incision clean and dry.  Try to have regular bowel movements, take stool softener or laxative if necessary.  Swelling may travel all the way down leg to foot, this is normal and will subside in a few weeks.  Call to schedule an appt with Dr. Gleason for follow up, if you have staples or sutures they will be removed in office.  Contact your doctor if you experience: fever greater than 101.5, chills, chest pain, difficulty breathing, redness or excessive drainage around the incision, other concerns.  Follow up with your primary care provider. **Please see Dr. Gleason's separate discharge instructions sheet. Your medications were sent to [facility], located on the first floor of [facility]. Take medications as prescribed.    You have a history of anemia. You should continue to take an iron supplement to help improve your anemia.     Your blood pressure was on the low side after surgery. Take your blood pressure daily at home. If the top number of your blood pressure is less than 110, you should not take your losartan. This can cause a decrease in blood pressure that will make you feel dizzy.     You were started on a new medication- metoprolol which is to help control your heart rate and prevent episodes of a-fib and palpitations. You should take 12.5mg twice daily. If the top number of your blood pressure is less than 90, you should not take this. You should also not take this if your heart beat is slower than 60 beats in 1 minute. Please follow-up with your cardiologist within the next 1-2 weeks to discuss this new medication and to be managed outpatient.    Weight bear as tolerated with assistive device  No strenuous activity, heavy lifting, driving or returning to work until cleared by MD.  You may shower - dressing is water-resistant, no soaking in bathtubs.  Remove dressing after post op day 5-7, then leave incision open to air. Keep incision clean and dry.  Try to have regular bowel movements, take stool softener or laxative if necessary.  Swelling may travel all the way down leg to foot, this is normal and will subside in a few weeks.  Call to schedule an appt with Dr. Gleason for follow up, if you have staples or sutures they will be removed in office.  Contact your doctor if you experience: fever greater than 101.5, chills, chest pain, difficulty breathing, redness or excessive drainage around the incision, other concerns.  Follow up with your primary care provider. **Please see Dr. Gleason's separate discharge instructions sheet. Your medications were sent to [facility], located on the first floor of [facility]. Take medications as prescribed.    You have a history of anemia. You should continue to take an iron supplement to help improve your anemia.     Your blood pressure was on the low side after surgery. Take your blood pressure daily at home. If the top number of your blood pressure is less than 110, you should not take your losartan. This can cause a decrease in blood pressure that will make you feel dizzy.     You were started on a new medication- metoprolol which is to help control your heart rate and prevent episodes of a-fib and palpitations. You should take 12.5mg (1/2 tablet) twice daily. You were given a 1 month supply. If the top number of your blood pressure is less than 90, you should not take this. You should also not take this if your heart beat is slower than 60 beats in 1 minute. Please follow-up with your cardiologist within the next 1-2 weeks to discuss this new medication and to be managed outpatient.    Weight bear as tolerated with assistive device  No strenuous activity, heavy lifting, driving or returning to work until cleared by MD.  You may shower - dressing is water-resistant, no soaking in bathtubs.  Remove dressing after post op day 5-7, then leave incision open to air. Keep incision clean and dry.  Try to have regular bowel movements, take stool softener or laxative if necessary.  Swelling may travel all the way down leg to foot, this is normal and will subside in a few weeks.  Call to schedule an appt with Dr. Gleason for follow up, if you have staples or sutures they will be removed in office.  Contact your doctor if you experience: fever greater than 101.5, chills, chest pain, difficulty breathing, redness or excessive drainage around the incision, other concerns.  Follow up with your primary care provider.

## 2019-10-08 NOTE — CONSULT NOTE ADULT - ASSESSMENT
50 yo F with HTN, Depression  Afib s/p right THR    1) Low BP  - BP meds held this AM   - restart IVF LR @100   - d/w nursing staff    2) AF - xarelto on hold but on lovenox currently    - restart xarelto as per orthopedic team    3) DVT ppx - lovenox    4) Anemia-  trend h/h    5) OOB with PT today - monitor BP

## 2019-10-08 NOTE — PHYSICAL THERAPY INITIAL EVALUATION ADULT - CRITERIA FOR SKILLED THERAPEUTIC INTERVENTIONS
anticipated equipment needs at discharge/predicted duration of therapy intervention/rehab potential/impairments found/functional limitations in following categories/therapy frequency/risk reduction/prevention/anticipated discharge recommendation

## 2019-10-08 NOTE — DISCHARGE NOTE PROVIDER - NSDCACTIVITY_GEN_ALL_CORE
Showering allowed/Walking - Indoors allowed/Do not drive or operate machinery/Walking - Outdoors allowed/No heavy lifting/straining/Stairs allowed

## 2019-10-08 NOTE — PHYSICAL THERAPY INITIAL EVALUATION ADULT - ADDITIONAL COMMENTS
Patient lives in apartment with no steps to enter. Patient endorses use of RW prior to admission for ambulation and functional ADL's. Patient lives in apartment with 5 steps to enter. Patient endorses use of RW prior to admission for ambulation and functional ADL's.

## 2019-10-08 NOTE — PROGRESS NOTE ADULT - SUBJECTIVE AND OBJECTIVE BOX
Ortho Note    Pt comfortable without complaints, pain controlled  Denies CP, SOB, N/V, numbness/tingling     Vital Signs Last 24 Hrs  T(C): 37.1 (10-08-19 @ 08:41), Max: 37.1 (10-08-19 @ 08:41)  T(F): 98.7 (10-08-19 @ 08:41), Max: 98.7 (10-08-19 @ 08:41)  HR: 59 (10-08-19 @ 08:41) (59 - 59)  BP: 98/61 (10-08-19 @ 08:41) (98/61 - 98/61)  BP(mean): --  RR: 15 (10-08-19 @ 08:41) (15 - 15)  SpO2: 99% (10-08-19 @ 08:41) (99% - 99%)  AVSS    focused exam:  General: Pt Alert and oriented, NAD  DSG- aquacel to R hip CDI  Pulses: +2DP, WWP feet  Sensation: SILT  Motor: 5/5 EHL/FHL/TA/GS                          9.0    11.57 )-----------( 208      ( 08 Oct 2019 06:27 )             28.7   08 Oct 2019 06:27    138    |  104    |  13     ----------------------------<  128    4.0     |  25     |  0.94     Ca    8.9        08 Oct 2019 06:27        A/P: 49yFemale POD#1 s/p right total hip replacement (superior)  - H+H, labs stable  - Pain Control- continue current regimen, pain well-controlled  - DVT ppx: ASA bid in house, home dose xarelto for d.c  - PT, WBS: WBAT  - OOB for meals, I/S  - hypotensive after morphine this AM- d/c'd, LR @ 100 continued  - a fib- currently controlled, NSR; continue metoprolol 12.5 bid as per Cardiology recs; hold for systolic BP < 90  - f/u echo  - appreciate cardiology recs  - dispo: home with HPT    Ortho Pager [phone]

## 2019-10-08 NOTE — DISCHARGE NOTE PROVIDER - NSDCCPCAREPLAN_GEN_ALL_CORE_FT
PRINCIPAL DISCHARGE DIAGNOSIS  Diagnosis: Primary osteoarthritis of right hip  Assessment and Plan of Treatment:

## 2019-10-08 NOTE — PROGRESS NOTE ADULT - SUBJECTIVE AND OBJECTIVE BOX
Chief Complaint/Reason for Consult: periop cv mgmt  INTERVAL HPI: comfortable no rvr on tele, no palp or syncope   	  MEDICATIONS:  losartan 50 milliGRAM(s) Oral daily  metoprolol tartrate 12.5 milliGRAM(s) Oral two times a day        acetaminophen   Tablet .. 650 milliGRAM(s) Oral every 6 hours  celecoxib 100 milliGRAM(s) Oral every 12 hours  morphine  - Injectable 2 milliGRAM(s) IV Push every 3 hours PRN  morphine  - Injectable 2 milliGRAM(s) IV Push every 15 minutes PRN  ondansetron Injectable 4 milliGRAM(s) IV Push every 6 hours PRN  oxyCODONE    IR 5 milliGRAM(s) Oral every 4 hours PRN  oxyCODONE    IR 10 milliGRAM(s) Oral every 4 hours PRN    aluminum hydroxide/magnesium hydroxide/simethicone Suspension 30 milliLiter(s) Oral four times a day PRN  docusate sodium 100 milliGRAM(s) Oral three times a day  magnesium hydroxide Suspension 30 milliLiter(s) Oral daily PRN  polyethylene glycol 3350 17 Gram(s) Oral daily  senna 2 Tablet(s) Oral at bedtime PRN      chlorhexidine 2% Cloths 1 Application(s) Topical once  enoxaparin Injectable 40 milliGRAM(s) SubCutaneous every 12 hours  influenza   Vaccine 0.5 milliLiter(s) IntraMuscular once  lactated ringers. 1000 milliLiter(s) IV Continuous <Continuous>      REVIEW OF SYSTEMS:  [x] As per HPI  CONSTITUTIONAL: No fever, weight loss, or fatigue  RESPIRATORY: No cough, wheezing, chills or hemoptysis; No Shortness of Breath  CARDIOVASCULAR: No chest pain, palpitations, dizziness, or leg swelling  GASTROINTESTINAL: No abdominal or epigastric pain. No nausea, vomiting, or hematemesis; No diarrhea or constipation. No melena or hematochezia.  MUSCULOSKELETAL: No joint pain or swelling; No muscle, back, or extremity pain  [x] All others negative	  [ ] Unable to obtain    PHYSICAL EXAM:  T(C): 37.1 (10-08-19 @ 08:41), Max: 37.1 (10-07-19 @ 17:47)  HR: 59 (10-08-19 @ 08:41) (59 - 129)  BP: 98/61 (10-08-19 @ 08:41) (86/50 - 154/73)  RR: 15 (10-08-19 @ 08:41) (13 - 22)  SpO2: 99% (10-08-19 @ 08:41) (97% - 100%)  Wt(kg): --  I&O's Summary    07 Oct 2019 07:01  -  08 Oct 2019 07:00  --------------------------------------------------------  IN: 950 mL / OUT: 1100 mL / NET: -150 mL          Appearance: Normal	  HEENT:   Normal oral mucosa  Cardiovascular: Normal S1 S2, No JVD, No murmurs, No edema  Respiratory: Lungs clear to auscultation	  Gastrointestinal:  Soft, Non-tender, + BS	  Extremities: Normal range of motion, No clubbing, cyanosis or edema  Vascular: Peripheral pulses palpable 2+ bilaterally    TELEMETRY: 	    ECG:  < from: 12 Lead ECG (09.09.19 @ 12:48) >  Diagnosis Line Sinus rhythm with premature supraventricular complexes  Cannot rule out Septal infarct , age undetermined      < end of copied text >    	  RADIOLOGY:   CXR:  CT:  US:    CARDIAC TESTING:  Echocardiogram:   Catheterization:  Stress Test:      LABS:	 	    CARDIAC MARKERS:                                  9.0    11.57 )-----------( 208      ( 08 Oct 2019 06:27 )             28.7     10-08    138  |  104  |  13  ----------------------------<  128<H>  4.0   |  25  |  0.94    Ca    8.9      08 Oct 2019 06:27      proBNP:   Lipid Profile:   HgA1c:   TSH:     ASSESSMENT/PLAN: 	    #Paroxysmal Atrial Fibrillation: rate controlled. A-Fib known, on home Xarleto, held for procedure   - can re-start anticoagulation per primary team   - continue metoprolol tartrate 12.5mg BID  - outpatient echocardiogram

## 2019-10-08 NOTE — PHYSICAL THERAPY INITIAL EVALUATION ADULT - GAIT DEVIATIONS NOTED, PT EVAL
I called and spoke with patient. Pt was asked who referred her to our office. She states that she was not referred by anyone. She was asked why she would be seen in our office. She states that she was diagnosed with ALS and Osteomyelitis. She was asked who diagnosed her with Osteomyelitis. She states that Dr Carrasco Sees diagnosed her. She was informed that we will need her records to be faxed to our office prior to her being seen. She verbalized understanding. She was given fax number to our office. decreased pat/decreased step length/decreased weight-shifting ability

## 2019-10-08 NOTE — PROGRESS NOTE ADULT - ASSESSMENT
A/P: 49yFemale s/p R SINCERE  - Stable  - Pain/Nausea Control  - Home meds  - AM labs show anemia -- will continue to monitor  - DVT ppx:  BID  - WBS: WBAT RLE  - PT: home w HPT  - Cardiology recs: continue tele and obtain outpatient echo  - d/c pending cardiac stability and progression with PT     Ortho Pager [phone]

## 2019-10-08 NOTE — PHYSICAL THERAPY INITIAL EVALUATION ADULT - PERTINENT HX OF CURRENT PROBLEM, REHAB EVAL
Patient is a 48 y/o F with chronic right hip pain progressively worsening to impact function, failed conservative treatment. Patient presents for elective orthopedic procedure.

## 2019-10-08 NOTE — DISCHARGE NOTE PROVIDER - NSDCHHHOMEBOUND_GEN_ALL_CORE
----- Message from Venessa Rivas MD sent at 1/31/2018 12:14 PM CST -----  Please inform patient lesion on the left mid paraspinal back was non-cancerous mole.  It did have some abnormal features, called a dysplastic mole, atypical mole or Aj's nevus.  No further treatment is needed at this time.  If the mole grows back or a pigment stain appears within the scar, patient should contact the clinic so the area can be re-examined.  Patient should return to clinic for a mole check in 8 months, sooner if needed.    Individuals who have these moles may be at somewhat increased risk of developing melanoma compared to the average person.  Therefore, patient should monitor skin for changes in existing moles or the appearance of new moles, and contact the clinic if there are any concerns.     Patient should also use sun protection (SPF 30 broad spectrum sunscreen and protective clothing) and avoid sunburns and tanning beds.   Patient should do a self-skin exam once monthly.  Call the clinic if there are:  --Any new or changing spots.  --Any spots that are bleeding, crusting, non-healing or painful.  --Moles that are new, or that have changed in size, shape, border or color.      Ataxic gait/Fall risk

## 2019-10-09 PROCEDURE — 99232 SBSQ HOSP IP/OBS MODERATE 35: CPT

## 2019-10-09 PROCEDURE — 99233 SBSQ HOSP IP/OBS HIGH 50: CPT | Mod: 25

## 2019-10-09 RX ORDER — FERROUS SULFATE 325(65) MG
325 TABLET ORAL
Refills: 0 | Status: DISCONTINUED | OUTPATIENT
Start: 2019-10-09 | End: 2019-10-10

## 2019-10-09 RX ORDER — LOSARTAN POTASSIUM 100 MG/1
50 TABLET, FILM COATED ORAL DAILY
Refills: 0 | Status: DISCONTINUED | OUTPATIENT
Start: 2019-10-09 | End: 2019-10-10

## 2019-10-09 RX ADMIN — POLYETHYLENE GLYCOL 3350 17 GRAM(S): 17 POWDER, FOR SOLUTION ORAL at 11:21

## 2019-10-09 RX ADMIN — OXYCODONE HYDROCHLORIDE 5 MILLIGRAM(S): 5 TABLET ORAL at 15:00

## 2019-10-09 RX ADMIN — OXYCODONE HYDROCHLORIDE 5 MILLIGRAM(S): 5 TABLET ORAL at 06:15

## 2019-10-09 RX ADMIN — OXYCODONE HYDROCHLORIDE 5 MILLIGRAM(S): 5 TABLET ORAL at 14:43

## 2019-10-09 RX ADMIN — CELECOXIB 100 MILLIGRAM(S): 200 CAPSULE ORAL at 05:42

## 2019-10-09 RX ADMIN — ENOXAPARIN SODIUM 40 MILLIGRAM(S): 100 INJECTION SUBCUTANEOUS at 05:44

## 2019-10-09 RX ADMIN — Medication 650 MILLIGRAM(S): at 17:54

## 2019-10-09 RX ADMIN — Medication 100 MILLIGRAM(S): at 21:16

## 2019-10-09 RX ADMIN — LOSARTAN POTASSIUM 50 MILLIGRAM(S): 100 TABLET, FILM COATED ORAL at 05:42

## 2019-10-09 RX ADMIN — Medication 100 MILLIGRAM(S): at 05:41

## 2019-10-09 RX ADMIN — CELECOXIB 100 MILLIGRAM(S): 200 CAPSULE ORAL at 17:50

## 2019-10-09 RX ADMIN — Medication 100 MILLIGRAM(S): at 14:17

## 2019-10-09 RX ADMIN — Medication 12.5 MILLIGRAM(S): at 17:51

## 2019-10-09 RX ADMIN — CELECOXIB 100 MILLIGRAM(S): 200 CAPSULE ORAL at 05:55

## 2019-10-09 RX ADMIN — OXYCODONE HYDROCHLORIDE 5 MILLIGRAM(S): 5 TABLET ORAL at 10:21

## 2019-10-09 RX ADMIN — Medication 650 MILLIGRAM(S): at 05:41

## 2019-10-09 RX ADMIN — OXYCODONE HYDROCHLORIDE 5 MILLIGRAM(S): 5 TABLET ORAL at 10:40

## 2019-10-09 RX ADMIN — Medication 12.5 MILLIGRAM(S): at 05:43

## 2019-10-09 RX ADMIN — Medication 650 MILLIGRAM(S): at 05:55

## 2019-10-09 RX ADMIN — Medication 650 MILLIGRAM(S): at 17:51

## 2019-10-09 RX ADMIN — Medication 650 MILLIGRAM(S): at 11:26

## 2019-10-09 RX ADMIN — CELECOXIB 100 MILLIGRAM(S): 200 CAPSULE ORAL at 17:54

## 2019-10-09 RX ADMIN — Medication 650 MILLIGRAM(S): at 11:20

## 2019-10-09 RX ADMIN — ENOXAPARIN SODIUM 40 MILLIGRAM(S): 100 INJECTION SUBCUTANEOUS at 17:51

## 2019-10-09 RX ADMIN — OXYCODONE HYDROCHLORIDE 5 MILLIGRAM(S): 5 TABLET ORAL at 05:46

## 2019-10-09 RX ADMIN — Medication 325 MILLIGRAM(S): at 17:51

## 2019-10-09 NOTE — PROGRESS NOTE ADULT - SUBJECTIVE AND OBJECTIVE BOX
INTERVAL HPI/OVERNIGHT EVENTS:  Feels well,   BP remains borderline.  Denies any symptoms of dizziness or lightheadedness.  Did well with PT yesterday.    MEDICATIONS  (STANDING):  acetaminophen   Tablet .. 650 milliGRAM(s) Oral every 6 hours  BUpivacaine liposome 1.3% Injectable (no eMAR) 20 milliLiter(s) Local Injection once  celecoxib 100 milliGRAM(s) Oral every 12 hours  chlorhexidine 2% Cloths 1 Application(s) Topical once  docusate sodium 100 milliGRAM(s) Oral three times a day  enoxaparin Injectable 40 milliGRAM(s) SubCutaneous every 12 hours  influenza   Vaccine 0.5 milliLiter(s) IntraMuscular once  lactated ringers. 1000 milliLiter(s) (100 mL/Hr) IV Continuous <Continuous>  losartan 50 milliGRAM(s) Oral daily  metoprolol tartrate 12.5 milliGRAM(s) Oral two times a day  polyethylene glycol 3350 17 Gram(s) Oral daily    MEDICATIONS  (PRN):  aluminum hydroxide/magnesium hydroxide/simethicone Suspension 30 milliLiter(s) Oral four times a day PRN Indigestion  bisacodyl Suppository 10 milliGRAM(s) Rectal daily PRN If no bowel movement by POD#2  magnesium hydroxide Suspension 30 milliLiter(s) Oral daily PRN Constipation  ondansetron Injectable 4 milliGRAM(s) IV Push every 6 hours PRN Nausea and/or Vomiting  oxyCODONE    IR 5 milliGRAM(s) Oral every 4 hours PRN Moderate Pain (4 - 6)  oxyCODONE    IR 10 milliGRAM(s) Oral every 4 hours PRN Severe Pain (7 - 10)  senna 2 Tablet(s) Oral at bedtime PRN Constipation      Allergies    penicillin (Hives)      REVIEW OF SYSTEMS:  CONSTITUTIONAL:  No night sweats.  No fatigue,  No fever or chills.  HEENT:  Eyes:  No visual changes.  No eye pain.    ENT:    No sinus pain.  No sore throat.    RESPIRATORY:  No cough.  No wheeze.    No shortness of breath.  CARDIOVASCULAR:  No chest pains.  No palpitations.  GASTROINTESTINAL:  No abdominal pain.  No nausea or vomiting.  No diarrhea   GENITOURINARY:  No urgency.  No frequency.  No dysuria.  .    MUSCULOSKELETAL:  right hip pain present  SKIN:  No rashes.      T(C): 37.4 (10-09-19 @ 05:25), Max: 37.7 (10-08-19 @ 21:30)  HR: 79 (10-09-19 @ 05:56) (59 - 79)  BP: 97/51 (10-09-19 @ 05:56) (92/53 - 136/64)  RR: 16 (10-09-19 @ 05:56) (15 - 17)  SpO2: 97% (10-09-19 @ 05:56) (95% - 100%)  Wt(kg): --    PHYSICAL EXAM:   General - NAD, Alert and oriented x 3,   Eyes - PERRLA, EOM intact  Neck - - No lymphadenopathy  Cardiovascular - RRR no m/r/g, no JVD  Lungs - Clear to auscutation, no use of acessory muscles, no crackles or wheezes.  Skin - No rashes, skin warm and dry, no erythematous areas  Abdomen - Normal bowel sounds, abdomen soft and nontender  Extremeties - No edema,   Neurological – no focal deficits  LABS:                        9.0    11.57 )-----------( 208      ( 08 Oct 2019 06:27 )             28.7     10-08    138  |  104  |  13  ----------------------------<  128<H>  4.0   |  25  |  0.94    Ca    8.9      08 Oct 2019 06:27      PT/INR - ( 08 Oct 2019 06:27 )   PT: 13.7 sec;   INR: 1.21          PTT - ( 08 Oct 2019 06:27 )  PTT:28.1 sec      RADIOLOGY & ADDITIONAL TESTS:

## 2019-10-09 NOTE — PROGRESS NOTE ADULT - SUBJECTIVE AND OBJECTIVE BOX
Ortho Note    Pt seen and examined. Comfortable without complaints, pain controlled  Denies CP, SOB, N/V, numbness/tingling. BP on low side, but is asymptomatic.  According to EMAR, patient received losartan and metoprolol this AM. According to patient,  she did not take her BP meds this AM.    Vital Signs Last 24 Hrs  T(C): 36.7 (10-09-19 @ 08:48), Max: 36.7 (10-09-19 @ 08:48)  T(F): 98.1 (10-09-19 @ 08:48), Max: 98.1 (10-09-19 @ 08:48)  HR: 63 (10-09-19 @ 11:26) (63 - 86)  BP: 97/50 (10-09-19 @ 11:26) (91/53 - 111/56)  BP(mean): 67 (10-09-19 @ 11:26) (67 - 68)  RR: 14 (10-09-19 @ 11:26) (14 - 18)  SpO2: 100% (10-09-19 @ 11:26) (92% - 100%)  AVSS    focused exam:  General: Pt Alert and oriented, NAD  DSG- aquacel to R hip CDI  Pulses: +2DP, WWP feet  Sensation: SILT  Motor: 5/5 EHL/FHL/TA/GS                          9.0    11.57 )-----------( 208      ( 08 Oct 2019 06:27 )             28.7   08 Oct 2019 06:27    138    |  104    |  13     ----------------------------<  128    4.0     |  25     |  0.94           A/P: 49yFemale POD#2 s/p right total hip replacement  - stable, in NSR; continue metoprolol 12.5mg bid with hold for SBP < 90, HR < 60  - Pain Control- continue current regimen, pain well-controlled  - DVT ppx: lovenox bid inhosue, xarelto on d/c home  - PT, WBS: WBAT  -borderline hypotension- hold losartan for SBP < 100  - BMI 41, morbid obesity- weight loss promotion education  - OOB for meals, I/S  - dispo: home with HPT, pending clearance    Ortho Pager [phone]

## 2019-10-09 NOTE — PROGRESS NOTE ADULT - ASSESSMENT
50 yo F with HTN, Depression  Afib s/p right THR    1) Low BP  - Continue to hold losartan if SBP remains <100    2) AF - xarelto on hold but on lovenox currently    - restart xarelto as per orthopedic team  - appreciate cardiology consultation    3) DVT ppx - lovenox    4) Anemia-  trend h/h    5) OOB with PT again today - monitor BP

## 2019-10-09 NOTE — PROGRESS NOTE ADULT - SUBJECTIVE AND OBJECTIVE BOX
Chief Complaint/Reason for Consult: afib  INTERVAL HPI: tele sinus with apcs, no rvr  	  MEDICATIONS:  losartan 50 milliGRAM(s) Oral daily  metoprolol tartrate 12.5 milliGRAM(s) Oral two times a day        acetaminophen   Tablet .. 650 milliGRAM(s) Oral every 6 hours  celecoxib 100 milliGRAM(s) Oral every 12 hours  ondansetron Injectable 4 milliGRAM(s) IV Push every 6 hours PRN  oxyCODONE    IR 5 milliGRAM(s) Oral every 4 hours PRN  oxyCODONE    IR 10 milliGRAM(s) Oral every 4 hours PRN    aluminum hydroxide/magnesium hydroxide/simethicone Suspension 30 milliLiter(s) Oral four times a day PRN  bisacodyl Suppository 10 milliGRAM(s) Rectal daily PRN  docusate sodium 100 milliGRAM(s) Oral three times a day  magnesium hydroxide Suspension 30 milliLiter(s) Oral daily PRN  polyethylene glycol 3350 17 Gram(s) Oral daily  senna 2 Tablet(s) Oral at bedtime PRN      chlorhexidine 2% Cloths 1 Application(s) Topical once  enoxaparin Injectable 40 milliGRAM(s) SubCutaneous every 12 hours  influenza   Vaccine 0.5 milliLiter(s) IntraMuscular once  lactated ringers. 1000 milliLiter(s) IV Continuous <Continuous>      REVIEW OF SYSTEMS:  [x] As per HPI  CONSTITUTIONAL: No fever, weight loss, or fatigue  RESPIRATORY: No cough, wheezing, chills or hemoptysis; No Shortness of Breath  CARDIOVASCULAR: No chest pain, palpitations, dizziness, or leg swelling  GASTROINTESTINAL: No abdominal or epigastric pain. No nausea, vomiting, or hematemesis; No diarrhea or constipation. No melena or hematochezia.  MUSCULOSKELETAL: No joint pain or swelling; No muscle, back, or extremity pain  [x] All others negative	  [ ] Unable to obtain    PHYSICAL EXAM:  T(C): 36.7 (10-09-19 @ 08:48), Max: 37.7 (10-08-19 @ 21:30)  HR: 72 (10-09-19 @ 08:48) (62 - 79)  BP: 104/56 (10-09-19 @ 08:48) (92/53 - 136/64)  RR: 18 (10-09-19 @ 08:48) (16 - 18)  SpO2: 92% (10-09-19 @ 08:48) (92% - 100%)  Wt(kg): --  I&O's Summary    08 Oct 2019 07:01  -  09 Oct 2019 07:00  --------------------------------------------------------  IN: 2340 mL / OUT: 800 mL / NET: 1540 mL          Appearance: Normal	  HEENT:   Normal oral mucosa  Cardiovascular: Normal S1 S2, No JVD, No murmurs, No edema  Respiratory: Lungs clear to auscultation	  Gastrointestinal:  Soft, Non-tender, + BS	  Extremities: Normal range of motion, No clubbing, cyanosis or edema  Vascular: Peripheral pulses palpable 2+ bilaterally    TELEMETRY: 	    ECG:   	  RADIOLOGY:   CXR:  CT:  US:    CARDIAC TESTING:  Echocardiogram:  Catheterization:  Stress Test:      LABS:	 	    CARDIAC MARKERS:                                  9.0    11.57 )-----------( 208      ( 08 Oct 2019 06:27 )             28.7     10-08    138  |  104  |  13  ----------------------------<  128<H>  4.0   |  25  |  0.94    Ca    8.9      08 Oct 2019 06:27      proBNP:   Lipid Profile:   HgA1c:   TSH:     ASSESSMENT/PLAN: 	    #Paroxysmal Atrial Fibrillation: rate controlled. A-Fib known, on home Xarleto, held for procedure   - can re-start anticoagulation per primary team   - continue metoprolol tartrate 12.5mg BID  - outpatient echocardiogram

## 2019-10-09 NOTE — PROGRESS NOTE ADULT - SUBJECTIVE AND OBJECTIVE BOX
Ortho Note    Pt comfortable without complaints, pain controlled  Denies CP, SOB, N/V, numbness/tingling     Vital Signs Last 24 Hrs  T(C): 36.7 (09 Oct 2019 08:48), Max: 37.7 (08 Oct 2019 21:30)  T(F): 98.1 (09 Oct 2019 08:48), Max: 99.8 (08 Oct 2019 21:30)  HR: 77 (09 Oct 2019 10:27) (62 - 79)  BP: 91/53 (09 Oct 2019 10:27) (91/53 - 136/64)  BP(mean): 68 (09 Oct 2019 10:27) (68 - 68)  RR: 14 (09 Oct 2019 10:27) (14 - 18)  SpO2: 96% (09 Oct 2019 10:27) (92% - 100%)    VSS  General: A&Ox3, NAD  RLE: Aquacell DSG C/D/I  Pulses: Foot WWP; DP pulse 2+; Cap refill < 2 sec  Sensation: SILT distally and symmetric to contralateral extremity  Motor: TA/EHL/FHL/GS 5/5 and symmetric to contralateral extremity      Labs:                        9.0    11.57 )-----------( 208      ( 08 Oct 2019 06:27 )             28.7       10-08    138  |  104  |  13  ----------------------------<  128<H>  4.0   |  25  |  0.94    Ca    8.9      08 Oct 2019 06:27            PT/INR - ( 08 Oct 2019 06:27 )   PT: 13.7 sec;   INR: 1.21          PTT - ( 08 Oct 2019 06:27 )  PTT:28.1 sec

## 2019-10-09 NOTE — PROGRESS NOTE ADULT - ASSESSMENT
A/P: 49yFemale s/p R SINCERE  - Stable  - Pain/Nausea Control  - Home meds  - DVT ppx:  BID  - WBS: WBAT RLE  - PT: home w HPT  - Cardiology recs: continue tele, metoprolol and obtain outpatient echo  - d/c pending cardiac stability and progression with PT   - Likely d/c today    Ortho Pager [phone]

## 2019-10-10 ENCOUNTER — TRANSCRIPTION ENCOUNTER (OUTPATIENT)
Age: 49
End: 2019-10-10

## 2019-10-10 VITALS — HEART RATE: 86 BPM | SYSTOLIC BLOOD PRESSURE: 127 MMHG | DIASTOLIC BLOOD PRESSURE: 72 MMHG

## 2019-10-10 LAB — SURGICAL PATHOLOGY STUDY: SIGNIFICANT CHANGE UP

## 2019-10-10 PROCEDURE — 99233 SBSQ HOSP IP/OBS HIGH 50: CPT

## 2019-10-10 RX ORDER — METOPROLOL TARTRATE 50 MG
0.5 TABLET ORAL
Qty: 30 | Refills: 0
Start: 2019-10-10 | End: 2019-11-08

## 2019-10-10 RX ORDER — LOSARTAN POTASSIUM 100 MG/1
1 TABLET, FILM COATED ORAL
Qty: 0 | Refills: 0 | DISCHARGE
Start: 2019-10-10

## 2019-10-10 RX ADMIN — Medication 650 MILLIGRAM(S): at 01:15

## 2019-10-10 RX ADMIN — Medication 650 MILLIGRAM(S): at 13:06

## 2019-10-10 RX ADMIN — Medication 12.5 MILLIGRAM(S): at 05:07

## 2019-10-10 RX ADMIN — ENOXAPARIN SODIUM 40 MILLIGRAM(S): 100 INJECTION SUBCUTANEOUS at 05:08

## 2019-10-10 RX ADMIN — OXYCODONE HYDROCHLORIDE 10 MILLIGRAM(S): 5 TABLET ORAL at 06:10

## 2019-10-10 RX ADMIN — Medication 650 MILLIGRAM(S): at 00:29

## 2019-10-10 RX ADMIN — Medication 100 MILLIGRAM(S): at 05:07

## 2019-10-10 RX ADMIN — Medication 100 MILLIGRAM(S): at 13:06

## 2019-10-10 RX ADMIN — OXYCODONE HYDROCHLORIDE 5 MILLIGRAM(S): 5 TABLET ORAL at 10:41

## 2019-10-10 RX ADMIN — LOSARTAN POTASSIUM 50 MILLIGRAM(S): 100 TABLET, FILM COATED ORAL at 05:11

## 2019-10-10 RX ADMIN — POLYETHYLENE GLYCOL 3350 17 GRAM(S): 17 POWDER, FOR SOLUTION ORAL at 13:06

## 2019-10-10 RX ADMIN — Medication 650 MILLIGRAM(S): at 14:09

## 2019-10-10 RX ADMIN — OXYCODONE HYDROCHLORIDE 10 MILLIGRAM(S): 5 TABLET ORAL at 05:06

## 2019-10-10 RX ADMIN — Medication 650 MILLIGRAM(S): at 05:07

## 2019-10-10 RX ADMIN — OXYCODONE HYDROCHLORIDE 5 MILLIGRAM(S): 5 TABLET ORAL at 09:51

## 2019-10-10 RX ADMIN — CELECOXIB 100 MILLIGRAM(S): 200 CAPSULE ORAL at 06:10

## 2019-10-10 RX ADMIN — Medication 650 MILLIGRAM(S): at 06:09

## 2019-10-10 RX ADMIN — CELECOXIB 100 MILLIGRAM(S): 200 CAPSULE ORAL at 05:07

## 2019-10-10 RX ADMIN — Medication 325 MILLIGRAM(S): at 05:08

## 2019-10-10 NOTE — DISCHARGE NOTE NURSING/CASE MANAGEMENT/SOCIAL WORK - PATIENT PORTAL LINK FT
You can access the FollowMyHealth Patient Portal offered by [facility] by registering at the following website: [email or web address]. By joining [facility]’s FollowMyHealth portal, you will also be able to view your health information using other applications (apps) compatible with our system.

## 2019-10-10 NOTE — PROGRESS NOTE ADULT - ASSESSMENT
48 yo F with HTN, Depression  Afib s/p right THR    1) Low BP  - remains borderline  - pt continues to remains asymptomatic  -  Continue to hold losartan if SBP remains <100    2) AF - xarelto on hold but on lovenox currently    - restart xarelto as per orthopedic team  - appreciate cardiology  recs    3) DVT ppx - lovenox    4) Anemia-  trend h/h

## 2019-10-10 NOTE — PROGRESS NOTE ADULT - SUBJECTIVE AND OBJECTIVE BOX
Ortho Note    Pt comfortable without complaints, pain controlled  Denies CP, SOB, N/V, numbness/tingling     Vital Signs Last 24 Hrs  T(C): 36.4 (10 Oct 2019 13:51), Max: 37.4 (09 Oct 2019 21:12)  T(F): 97.5 (10 Oct 2019 13:51), Max: 99.4 (09 Oct 2019 21:12)  HR: 86 (10 Oct 2019 14:05) (63 - 88)  BP: 127/72 (10 Oct 2019 14:05) (94/61 - 127/72)  BP(mean): 77 (09 Oct 2019 16:16) (77 - 77)  RR: 18 (10 Oct 2019 13:51) (14 - 18)  SpO2: 100% (10 Oct 2019 13:51) (98% - 100%)    VSS  General: A&Ox3, NAD  RLE: Aquacell DSG C/D/I  Pulses: Foot WWP; DP pulse 2+; Cap refill < 2 sec  Sensation: SILT distally and symmetric to contralateral extremity  Motor: TA/EHL/FHL/GS 5/5 and symmetric to contralateral extremity

## 2019-10-10 NOTE — PROGRESS NOTE ADULT - REASON FOR ADMISSION
R hip pain

## 2019-10-10 NOTE — PROGRESS NOTE ADULT - SUBJECTIVE AND OBJECTIVE BOX
INTERVAL HPI/OVERNIGHT EVENTS:  Feels well, no issues overnight    MEDICATIONS  (STANDING):  acetaminophen   Tablet .. 650 milliGRAM(s) Oral every 6 hours  BUpivacaine liposome 1.3% Injectable (no eMAR) 20 milliLiter(s) Local Injection once  celecoxib 100 milliGRAM(s) Oral every 12 hours  chlorhexidine 2% Cloths 1 Application(s) Topical once  docusate sodium 100 milliGRAM(s) Oral three times a day  enoxaparin Injectable 40 milliGRAM(s) SubCutaneous every 12 hours  ferrous    sulfate 325 milliGRAM(s) Oral two times a day  influenza   Vaccine 0.5 milliLiter(s) IntraMuscular once  lactated ringers. 1000 milliLiter(s) (100 mL/Hr) IV Continuous <Continuous>  losartan 50 milliGRAM(s) Oral daily  metoprolol tartrate 12.5 milliGRAM(s) Oral two times a day  polyethylene glycol 3350 17 Gram(s) Oral daily    MEDICATIONS  (PRN):  aluminum hydroxide/magnesium hydroxide/simethicone Suspension 30 milliLiter(s) Oral four times a day PRN Indigestion  bisacodyl Suppository 10 milliGRAM(s) Rectal daily PRN If no bowel movement by POD#2  magnesium hydroxide Suspension 30 milliLiter(s) Oral daily PRN Constipation  ondansetron Injectable 4 milliGRAM(s) IV Push every 6 hours PRN Nausea and/or Vomiting  oxyCODONE    IR 5 milliGRAM(s) Oral every 4 hours PRN Moderate Pain (4 - 6)  oxyCODONE    IR 10 milliGRAM(s) Oral every 4 hours PRN Severe Pain (7 - 10)  senna 2 Tablet(s) Oral at bedtime PRN Constipation      Allergies    penicillin (Hives)      REVIEW OF SYSTEMS:  CONSTITUTIONAL:  No night sweats.  No fatigue,  No fever or chills.  HEENT:  Eyes:  No visual changes.  No eye pain.    ENT:    No sinus pain.  No sore throat.    RESPIRATORY:  No cough.  No wheeze.    No shortness of breath.  CARDIOVASCULAR:  No chest pains.  No palpitations.  GASTROINTESTINAL:  No abdominal pain.  No nausea or vomiting.  No diarrhea   GENITOURINARY:  No urgency.  No frequency.  No dysuria.  .    MUSCULOSKELETAL:  right hip pain present  SKIN:  No rashes.      T(C): 36.8 (10-10-19 @ 05:03), Max: 37.4 (10-09-19 @ 21:12)  HR: 73 (10-10-19 @ 05:04) (63 - 88)  BP: 119/65 (10-10-19 @ 05:04) (84/59 - 119/65)  RR: 18 (10-10-19 @ 05:04) (14 - 18)  SpO2: 98% (10-10-19 @ 05:04) (92% - 100%)  Wt(kg): --    PHYSICAL EXAM:   General - NAD, Alert and oriented x 3,   Neck - - No lymphadenopathy  Cardiovascular - RRR no m/r/g, no JVD  Lungs - Clear to auscutation, no use of acessory muscles, no crackles or wheezes.  Skin - No rashes, skin warm and dry,  Abdomen - Normal bowel sounds, abdomen soft and nontender  Extremeties - No edema,     LABS:                RADIOLOGY & ADDITIONAL TESTS:

## 2019-10-10 NOTE — PROGRESS NOTE ADULT - SUBJECTIVE AND OBJECTIVE BOX
Ortho Note    Pt comfortable without complaints, pain controlled  Denies CP, SOB, N/V, numbness/tingling     Vital Signs Last 24 Hrs  T(C): 36.5 (10-10-19 @ 09:22), Max: 36.8 (10-10-19 @ 05:03)  T(F): 97.7 (10-10-19 @ 09:22), Max: 98.3 (10-10-19 @ 05:03)  HR: 64 (10-10-19 @ 09:22) (64 - 73)  BP: 107/71 (10-10-19 @ 09:22) (107/71 - 119/65)  BP(mean): --  RR: 18 (10-10-19 @ 09:22) (18 - 18)  SpO2: 100% (10-10-19 @ 09:22) (98% - 100%)  AVSS    focused exam:  General: Pt Alert and oriented, NAD  DSG- aquacel to R hip CDI  Pulses: +2DP, WWP feet  Sensation: SILT  Motor: 5/5 EHL/FHL/TA/GS            A/P: 49yFemale POD#3 s/p right total hip replacement (superior)  - BP stable and patient asymptomatic when BP in 90s, hold losartan for SBP <110. Patient instructed to take BP at home.  - Pain Control  - DVT ppx: ASA bid - > xarelto (home dose) for d/c  - PT, WBS: WBAT  - continue bowel regimen  -OOB for meals, I/S  - continue iron supplementation for chronic anemia  - dispo: home with [facility], pending clearance    Ortho Pager [phone]

## 2019-10-14 DIAGNOSIS — M54.30 SCIATICA, UNSPECIFIED SIDE: ICD-10-CM

## 2019-10-14 DIAGNOSIS — F32.9 MAJOR DEPRESSIVE DISORDER, SINGLE EPISODE, UNSPECIFIED: ICD-10-CM

## 2019-10-14 DIAGNOSIS — M51.26 OTHER INTERVERTEBRAL DISC DISPLACEMENT, LUMBAR REGION: ICD-10-CM

## 2019-10-14 DIAGNOSIS — Z88.0 ALLERGY STATUS TO PENICILLIN: ICD-10-CM

## 2019-10-14 DIAGNOSIS — Z79.01 LONG TERM (CURRENT) USE OF ANTICOAGULANTS: ICD-10-CM

## 2019-10-14 DIAGNOSIS — M16.11 UNILATERAL PRIMARY OSTEOARTHRITIS, RIGHT HIP: ICD-10-CM

## 2019-10-14 DIAGNOSIS — Z96.642 PRESENCE OF LEFT ARTIFICIAL HIP JOINT: ICD-10-CM

## 2019-10-14 DIAGNOSIS — I48.0 PAROXYSMAL ATRIAL FIBRILLATION: ICD-10-CM

## 2019-10-14 DIAGNOSIS — I95.2 HYPOTENSION DUE TO DRUGS: ICD-10-CM

## 2019-10-14 DIAGNOSIS — I10 ESSENTIAL (PRIMARY) HYPERTENSION: ICD-10-CM

## 2019-10-14 DIAGNOSIS — D64.9 ANEMIA, UNSPECIFIED: ICD-10-CM

## 2019-10-14 DIAGNOSIS — E66.01 MORBID (SEVERE) OBESITY DUE TO EXCESS CALORIES: ICD-10-CM

## 2019-10-14 DIAGNOSIS — T40.2X5A ADVERSE EFFECT OF OTHER OPIOIDS, INITIAL ENCOUNTER: ICD-10-CM

## 2019-10-23 ENCOUNTER — FORM ENCOUNTER (OUTPATIENT)
Age: 49
End: 2019-10-23

## 2019-10-24 ENCOUNTER — OUTPATIENT (OUTPATIENT)
Dept: OUTPATIENT SERVICES | Facility: HOSPITAL | Age: 49
LOS: 1 days | End: 2019-10-24
Payer: COMMERCIAL

## 2019-10-24 ENCOUNTER — APPOINTMENT (OUTPATIENT)
Dept: ORTHOPEDIC SURGERY | Facility: CLINIC | Age: 49
End: 2019-10-24
Payer: MEDICAID

## 2019-10-24 VITALS
SYSTOLIC BLOOD PRESSURE: 130 MMHG | DIASTOLIC BLOOD PRESSURE: 80 MMHG | BODY MASS INDEX: 41.46 KG/M2 | HEART RATE: 74 BPM | WEIGHT: 258 LBS | OXYGEN SATURATION: 98 % | HEIGHT: 66 IN

## 2019-10-24 DIAGNOSIS — Z96.642 PRESENCE OF LEFT ARTIFICIAL HIP JOINT: Chronic | ICD-10-CM

## 2019-10-24 PROCEDURE — 73501 X-RAY EXAM HIP UNI 1 VIEW: CPT

## 2019-10-24 PROCEDURE — 99024 POSTOP FOLLOW-UP VISIT: CPT

## 2019-10-24 PROCEDURE — 73501 X-RAY EXAM HIP UNI 1 VIEW: CPT | Mod: 26,RT

## 2019-10-24 NOTE — HISTORY OF PRESENT ILLNESS
[Chills] : no chills [Constipation] : no constipation [Diarrhea] : no diarrhea [Dysuria] : no dysuria [Fever] : no fever [Nausea] : no nausea [Vomiting] : no vomiting [de-identified] : First post op visit s/p right superior SINCERE 10/7/19 [de-identified] : Patient is alert and oriented x3.\par Leg lengths clinically equal\par Right hip: Well-healed surgical scar without erythema or ecchymosis. Acceptable post op swelling. ROM from full extension to 90 degrees of flexion, external rotation 45 degrees, internal rotation neutral, abduction 45 degrees, adduction 10 degrees. Calf soft and nontender. NVI. Flexor power 4/5.  [de-identified] : Patient presents for first post op visit s/p right superior SINCERE 10/7/19. She states she is doing well overall. Pain is well controlled with one to two percocet daily. She is doing home PT and ambulating with a cane.  [de-identified] : X-rays taken today demonstrate well-fixed right total hip replacement in overall good alignment [de-identified] : Patient is doing well 2 weeks post op. Advised to be gradual with increase in activity. Continue HEP. Continue pain medication prn, gradually wean off as tolerated. Follow up in 4-6 weeks.

## 2019-10-31 PROCEDURE — 80048 BASIC METABOLIC PNL TOTAL CA: CPT

## 2019-10-31 PROCEDURE — 84484 ASSAY OF TROPONIN QUANT: CPT

## 2019-10-31 PROCEDURE — 97116 GAIT TRAINING THERAPY: CPT

## 2019-10-31 PROCEDURE — C1776: CPT

## 2019-10-31 PROCEDURE — 72170 X-RAY EXAM OF PELVIS: CPT

## 2019-10-31 PROCEDURE — 93306 TTE W/DOPPLER COMPLETE: CPT

## 2019-10-31 PROCEDURE — 85027 COMPLETE CBC AUTOMATED: CPT

## 2019-10-31 PROCEDURE — 85610 PROTHROMBIN TIME: CPT

## 2019-10-31 PROCEDURE — 88300 SURGICAL PATH GROSS: CPT

## 2019-10-31 PROCEDURE — 85730 THROMBOPLASTIN TIME PARTIAL: CPT

## 2019-10-31 PROCEDURE — 36415 COLL VENOUS BLD VENIPUNCTURE: CPT

## 2019-11-04 ENCOUNTER — MEDICATION RENEWAL (OUTPATIENT)
Age: 49
End: 2019-11-04

## 2019-11-22 ENCOUNTER — APPOINTMENT (OUTPATIENT)
Dept: ORTHOPEDIC SURGERY | Facility: CLINIC | Age: 49
End: 2019-11-22
Payer: SELF-PAY

## 2019-11-22 DIAGNOSIS — Z96.642 AFTERCARE FOLLOWING JOINT REPLACEMENT SURGERY: ICD-10-CM

## 2019-11-22 DIAGNOSIS — Z47.1 AFTERCARE FOLLOWING JOINT REPLACEMENT SURGERY: ICD-10-CM

## 2019-11-22 DIAGNOSIS — M16.31 UNILATERAL OSTEOARTHRITIS RESULTING FROM HIP DYSPLASIA, RIGHT HIP: ICD-10-CM

## 2019-11-22 DIAGNOSIS — M16.2 BILATERAL OSTEOARTHRITIS RESULTING FROM HIP DYSPLASIA: ICD-10-CM

## 2019-11-22 DIAGNOSIS — Z96.641 AFTERCARE FOLLOWING JOINT REPLACEMENT SURGERY: ICD-10-CM

## 2019-11-22 PROCEDURE — 99024 POSTOP FOLLOW-UP VISIT: CPT

## 2019-11-22 RX ORDER — OXYCODONE AND ACETAMINOPHEN 5; 325 MG/1; MG/1
5-325 TABLET ORAL
Qty: 50 | Refills: 0 | Status: DISCONTINUED | COMMUNITY
Start: 2019-05-08 | End: 2019-11-22

## 2019-11-22 RX ORDER — OXYCODONE AND ACETAMINOPHEN 5; 325 MG/1; MG/1
5-325 TABLET ORAL
Qty: 50 | Refills: 0 | Status: DISCONTINUED | COMMUNITY
Start: 2019-10-07 | End: 2019-11-22

## 2019-11-22 RX ORDER — ACETAMINOPHEN AND CODEINE PHOSPHATE 300; 30 MG/1; MG/1
TABLET ORAL
Refills: 0 | Status: DISCONTINUED | COMMUNITY
End: 2019-11-22

## 2019-11-22 RX ORDER — CELECOXIB 100 MG/1
100 CAPSULE ORAL TWICE DAILY
Qty: 60 | Refills: 0 | Status: DISCONTINUED | COMMUNITY
Start: 2019-07-08 | End: 2019-11-22

## 2019-11-22 RX ORDER — CELECOXIB 100 MG/1
100 CAPSULE ORAL TWICE DAILY
Qty: 84 | Refills: 0 | Status: DISCONTINUED | COMMUNITY
Start: 2019-10-07 | End: 2019-11-22

## 2019-11-22 NOTE — HISTORY OF PRESENT ILLNESS
[Clean/Dry/Intact] : clean, dry and intact [Healed] : healed [Doing Well] : is doing well [Excellent Pain Control] : has excellent pain control [No Sign of Infection] : is showing no signs of infection [Chills] : no chills [Fever] : no fever [de-identified] : Second post op s/p right superior SINCERE 10/7/19. [de-identified] : 49 year old female presents for second post op. She reports slight, occasional right hip pain localized to the side of the hip that occurs upon rising in the morning. She has difficulty placing shoes on the right foot. Patient can walk 5-10 blocks with a moderate limp and a cane for long walks. She uses a rail to ascend and descend stairs. She can sit comfortably in an ordinary chair but does not use public transportation. She is not taking anything for pain.\par Ms. Boswell is also s/p left THR and is doing well overall. [de-identified] : Patient is alert and oriented x3.\par Leg lengths clinically equal.\par Right hip: Well-healed surgical scar without erythema or ecchymosis. Acceptable post-op swelling. ROM from full extension to 90-95 degrees of flexion. 30 degrees of internal rotation. 30 degrees of external rotation. 45 degrees of abduction. 15 degrees of adduction.\par Calf is soft and nontender.\par NVI.\par \par Left Hip:\par Skin intact. (+) Well healed surgical scar. No erythema. No ecchymosis. No swelling. No deformity. No focal tenderness.\par Painless ROM from full extension to  degrees of flexion. 30 degrees of internal rotation. 40 degrees of external rotation. 55 degrees of abduction. 20 degrees of adduction.\par No crepitation. No instability.\par FANNIE painless. Impingement (FADIR) painless. Stinchfield painless. [de-identified] : No new imaging was reviewed at this time. [de-identified] : Ms. Boswell is doing well. I showed her a stretch she can do to regain hip ROM.\par Follow up in 2-3 months with symptoms, one year post surgery, or PRN.

## 2019-11-22 NOTE — END OF VISIT
[FreeTextEntry3] : All medical record entries made by Layla Nascimento acting as a scribe for the performing provider (Rufino Gleason MD and/or REYNALDO Ramirez) on 11/22/2019. All entries were dictated to me by the performing medical provider. In signing this record, the medical provider affirms that they have personally performed the history, physical exam, assessment and plan and have also directed, reviewed and agreed to the documentation in the chart.

## 2020-01-05 ENCOUNTER — FORM ENCOUNTER (OUTPATIENT)
Age: 50
End: 2020-01-05

## 2020-05-01 ENCOUNTER — OUTPATIENT (OUTPATIENT)
Dept: OUTPATIENT SERVICES | Facility: HOSPITAL | Age: 50
LOS: 1 days | End: 2020-05-01
Payer: COMMERCIAL

## 2020-05-01 DIAGNOSIS — Z96.642 PRESENCE OF LEFT ARTIFICIAL HIP JOINT: Chronic | ICD-10-CM

## 2020-05-01 PROCEDURE — G9001: CPT

## 2020-05-29 DIAGNOSIS — Z71.89 OTHER SPECIFIED COUNSELING: ICD-10-CM

## 2021-03-24 ENCOUNTER — APPOINTMENT (OUTPATIENT)
Dept: RADIOLOGY | Facility: HOSPITAL | Age: 51
End: 2021-03-24
Payer: MEDICAID

## 2021-03-24 ENCOUNTER — APPOINTMENT (OUTPATIENT)
Dept: ULTRASOUND IMAGING | Facility: HOSPITAL | Age: 51
End: 2021-03-24
Payer: MEDICAID

## 2021-03-24 ENCOUNTER — OUTPATIENT (OUTPATIENT)
Dept: OUTPATIENT SERVICES | Facility: HOSPITAL | Age: 51
LOS: 1 days | End: 2021-03-24
Payer: COMMERCIAL

## 2021-03-24 ENCOUNTER — APPOINTMENT (OUTPATIENT)
Dept: MAMMOGRAPHY | Facility: HOSPITAL | Age: 51
End: 2021-03-24
Payer: MEDICAID

## 2021-03-24 DIAGNOSIS — Z96.642 PRESENCE OF LEFT ARTIFICIAL HIP JOINT: Chronic | ICD-10-CM

## 2021-03-24 PROCEDURE — 76641 ULTRASOUND BREAST COMPLETE: CPT | Mod: 26,50

## 2021-03-24 PROCEDURE — 76641 ULTRASOUND BREAST COMPLETE: CPT

## 2021-03-24 PROCEDURE — 77063 BREAST TOMOSYNTHESIS BI: CPT | Mod: 26

## 2021-03-24 PROCEDURE — 77067 SCR MAMMO BI INCL CAD: CPT | Mod: 26

## 2021-03-24 PROCEDURE — 77085 DXA BONE DENSITY AXL VRT FX: CPT | Mod: 26

## 2021-03-24 PROCEDURE — 77067 SCR MAMMO BI INCL CAD: CPT

## 2021-03-24 PROCEDURE — 77085 DXA BONE DENSITY AXL VRT FX: CPT

## 2021-03-24 PROCEDURE — 77063 BREAST TOMOSYNTHESIS BI: CPT

## 2021-04-12 ENCOUNTER — RESULT REVIEW (OUTPATIENT)
Age: 51
End: 2021-04-12

## 2021-04-12 ENCOUNTER — APPOINTMENT (OUTPATIENT)
Dept: ULTRASOUND IMAGING | Facility: HOSPITAL | Age: 51
End: 2021-04-12
Payer: MEDICAID

## 2021-04-12 ENCOUNTER — OUTPATIENT (OUTPATIENT)
Dept: OUTPATIENT SERVICES | Facility: HOSPITAL | Age: 51
LOS: 1 days | End: 2021-04-12
Payer: COMMERCIAL

## 2021-04-12 DIAGNOSIS — Z96.642 PRESENCE OF LEFT ARTIFICIAL HIP JOINT: Chronic | ICD-10-CM

## 2021-04-12 PROCEDURE — A4648: CPT

## 2021-04-12 PROCEDURE — 88305 TISSUE EXAM BY PATHOLOGIST: CPT | Mod: 26

## 2021-04-12 PROCEDURE — 88305 TISSUE EXAM BY PATHOLOGIST: CPT

## 2021-04-12 PROCEDURE — 19083 BX BREAST 1ST LESION US IMAG: CPT

## 2021-04-12 PROCEDURE — 77065 DX MAMMO INCL CAD UNI: CPT | Mod: 26,LT

## 2021-04-12 PROCEDURE — 77065 DX MAMMO INCL CAD UNI: CPT

## 2021-04-12 PROCEDURE — 19083 BX BREAST 1ST LESION US IMAG: CPT | Mod: LT

## 2021-04-13 LAB — SURGICAL PATHOLOGY STUDY: SIGNIFICANT CHANGE UP

## 2021-04-15 DIAGNOSIS — N63.21 UNSPECIFIED LUMP IN THE LEFT BREAST, UPPER OUTER QUADRANT: ICD-10-CM

## 2021-04-15 DIAGNOSIS — N63.22 UNSPECIFIED LUMP IN THE LEFT BREAST, UPPER INNER QUADRANT: ICD-10-CM

## 2021-06-29 NOTE — PHYSICAL THERAPY INITIAL EVALUATION ADULT - GENERAL OBSERVATIONS, REHAB EVAL
Patient received in bed in no acute distress, +provena vac, SCD, EKG, heplock, abductor wedge. Patient without complaints. Scalpel Size: 15 blade

## 2021-07-08 NOTE — PROGRESS NOTE ADULT - SUBJECTIVE AND OBJECTIVE BOX
Orthopedics     Pt comfortable, without complaints. Pain well controlled this am.   Denies CP, SOB, N/V, numbness/tingling     Vital Signs Last 24 Hrs  T(C): 36.5 (09 May 2019 09:03), Max: 36.8 (08 May 2019 16:00)  T(F): 97.7 (09 May 2019 09:03), Max: 98.2 (08 May 2019 16:00)  HR: 65 (09 May 2019 09:03) (59 - 128)  BP: 95/45 (09 May 2019 09:03) (91/52 - 139/95)  BP(mean): 97 (08 May 2019 17:00) (81 - 108)  RR: 18 (09 May 2019 09:03) (16 - 35)  SpO2: 100% (09 May 2019 09:03) (94% - 100%)    General: Pt Alert and oriented, comfortable  Dressing C/D/I. Prevena in place.  Sensation intact and equal BLE   Motor ehl/ta/gs/fhl 5/5 BLE   Pulses dp palpable BLE, skin warm and well perfused     A/P: 49yFemale s/p left SINCERE   - Stable  - Pain Control  - DVT ppx: Lovenox  - Desiree-op abx: Ancef  - PT, WBS: WBAT   - F/U AM Labs show

## 2021-10-19 ENCOUNTER — APPOINTMENT (OUTPATIENT)
Dept: MAMMOGRAPHY | Facility: HOSPITAL | Age: 51
End: 2021-10-19
Payer: MEDICAID

## 2021-10-19 ENCOUNTER — OUTPATIENT (OUTPATIENT)
Dept: OUTPATIENT SERVICES | Facility: HOSPITAL | Age: 51
LOS: 1 days | End: 2021-10-19
Payer: COMMERCIAL

## 2021-10-19 ENCOUNTER — APPOINTMENT (OUTPATIENT)
Dept: ULTRASOUND IMAGING | Facility: HOSPITAL | Age: 51
End: 2021-10-19
Payer: MEDICAID

## 2021-10-19 DIAGNOSIS — Z96.642 PRESENCE OF LEFT ARTIFICIAL HIP JOINT: Chronic | ICD-10-CM

## 2021-10-19 PROCEDURE — 77066 DX MAMMO INCL CAD BI: CPT

## 2021-10-19 PROCEDURE — G0279: CPT | Mod: 26

## 2021-10-19 PROCEDURE — G0279: CPT

## 2021-10-19 PROCEDURE — 76641 ULTRASOUND BREAST COMPLETE: CPT | Mod: 26,50

## 2021-10-19 PROCEDURE — 77066 DX MAMMO INCL CAD BI: CPT | Mod: 26

## 2021-10-19 PROCEDURE — 76641 ULTRASOUND BREAST COMPLETE: CPT

## 2021-12-03 NOTE — PATIENT PROFILE ADULT - NSPROSPIRITUALVALUESFT_GEN_A_NUR
Impression: Cortical age-related cataract, bilateral Plan: No treatment currently recommended due to level of vision. Patient will monitor vision changes and contact us with any decrease in vision, will re-evaluate cataract on return visit. jessica

## 2022-12-21 ENCOUNTER — APPOINTMENT (OUTPATIENT)
Dept: ULTRASOUND IMAGING | Facility: HOSPITAL | Age: 52
End: 2022-12-21

## 2022-12-21 ENCOUNTER — OUTPATIENT (OUTPATIENT)
Dept: OUTPATIENT SERVICES | Facility: HOSPITAL | Age: 52
LOS: 1 days | End: 2022-12-21
Payer: COMMERCIAL

## 2022-12-21 ENCOUNTER — APPOINTMENT (OUTPATIENT)
Dept: MAMMOGRAPHY | Facility: HOSPITAL | Age: 52
End: 2022-12-21

## 2022-12-21 DIAGNOSIS — Z96.642 PRESENCE OF LEFT ARTIFICIAL HIP JOINT: Chronic | ICD-10-CM

## 2022-12-21 PROCEDURE — 76641 ULTRASOUND BREAST COMPLETE: CPT

## 2022-12-21 PROCEDURE — 77067 SCR MAMMO BI INCL CAD: CPT | Mod: 26

## 2022-12-21 PROCEDURE — 76641 ULTRASOUND BREAST COMPLETE: CPT | Mod: 26,50

## 2022-12-21 PROCEDURE — 77067 SCR MAMMO BI INCL CAD: CPT

## 2022-12-21 PROCEDURE — 77063 BREAST TOMOSYNTHESIS BI: CPT | Mod: 26

## 2022-12-21 PROCEDURE — 77063 BREAST TOMOSYNTHESIS BI: CPT

## 2023-05-15 ENCOUNTER — OUTPATIENT (OUTPATIENT)
Dept: OUTPATIENT SERVICES | Facility: HOSPITAL | Age: 53
LOS: 1 days | End: 2023-05-15
Payer: COMMERCIAL

## 2023-05-15 ENCOUNTER — RESULT REVIEW (OUTPATIENT)
Age: 53
End: 2023-05-15

## 2023-05-15 ENCOUNTER — APPOINTMENT (OUTPATIENT)
Dept: ORTHOPEDIC SURGERY | Facility: CLINIC | Age: 53
End: 2023-05-15
Payer: MEDICAID

## 2023-05-15 VITALS
BODY MASS INDEX: 41.46 KG/M2 | HEIGHT: 66 IN | SYSTOLIC BLOOD PRESSURE: 128 MMHG | DIASTOLIC BLOOD PRESSURE: 79 MMHG | WEIGHT: 258 LBS | OXYGEN SATURATION: 95 % | HEART RATE: 71 BPM

## 2023-05-15 DIAGNOSIS — M16.0 BILATERAL PRIMARY OSTEOARTHRITIS OF HIP: ICD-10-CM

## 2023-05-15 DIAGNOSIS — Z78.9 OTHER SPECIFIED HEALTH STATUS: ICD-10-CM

## 2023-05-15 DIAGNOSIS — Z72.3 LACK OF PHYSICAL EXERCISE: ICD-10-CM

## 2023-05-15 DIAGNOSIS — Z96.643 PRESENCE OF ARTIFICIAL HIP JOINT, BILATERAL: ICD-10-CM

## 2023-05-15 DIAGNOSIS — Z96.642 PRESENCE OF LEFT ARTIFICIAL HIP JOINT: Chronic | ICD-10-CM

## 2023-05-15 DIAGNOSIS — Z80.9 FAMILY HISTORY OF MALIGNANT NEOPLASM, UNSPECIFIED: ICD-10-CM

## 2023-05-15 DIAGNOSIS — Z82.61 FAMILY HISTORY OF ARTHRITIS: ICD-10-CM

## 2023-05-15 PROCEDURE — 73521 X-RAY EXAM HIPS BI 2 VIEWS: CPT | Mod: 26

## 2023-05-15 PROCEDURE — 73521 X-RAY EXAM HIPS BI 2 VIEWS: CPT

## 2023-05-15 PROCEDURE — 99203 OFFICE O/P NEW LOW 30 MIN: CPT

## 2023-05-15 RX ORDER — RIVAROXABAN 2.5 MG/1
TABLET, FILM COATED ORAL
Refills: 0 | Status: DISCONTINUED | COMMUNITY
End: 2023-05-15

## 2023-05-15 RX ORDER — AMLODIPINE BESYLATE 10 MG/1
10 TABLET ORAL
Qty: 30 | Refills: 0 | Status: ACTIVE | COMMUNITY
Start: 2022-10-31

## 2023-05-15 RX ORDER — APIXABAN 5 MG/1
5 TABLET, FILM COATED ORAL
Qty: 6 | Refills: 0 | Status: ACTIVE | COMMUNITY
Start: 2023-04-28

## 2023-05-15 RX ORDER — PHENTERMINE HYDROCHLORIDE 37.5 MG/1
37.5 CAPSULE ORAL
Qty: 30 | Refills: 0 | Status: ACTIVE | COMMUNITY
Start: 2023-04-19

## 2023-05-15 RX ORDER — ALBUTEROL SULFATE 90 UG/1
108 (90 BASE) INHALANT RESPIRATORY (INHALATION)
Qty: 7 | Refills: 0 | Status: ACTIVE | COMMUNITY
Start: 2022-08-24

## 2023-05-15 RX ORDER — SILVER SULFADIAZINE 10 MG/G
1 CREAM TOPICAL TWICE DAILY
Qty: 1 | Refills: 1 | Status: DISCONTINUED | COMMUNITY
Start: 2019-05-23 | End: 2023-05-15

## 2023-05-15 NOTE — PHYSICAL EXAM
[de-identified] : General: Patient is a well-appearing female in no apparent distress. She is alert and oriented x 3. Vital signs are within normal limits.  No sign of fevers or infectious symptoms.  \par Hygiene: Excellent\par HEENT: Atraumatic with no asymmetry.  Neck motion is normal. No overt hearing deficits.\par Pulmonary: Breathing comfortably.\par Gastrointestinal: Is obese.  \par Psych: Responding appropriately with appropriate affect. Patient does not demonstrate tangential thought, perseveration or anxiety.\par Vascular: No rashes or obvious skin abnormalities in either lower extremity. Capillary refill is <2sec. Good distal perfusion.\par Neurovascular:\par Varicose veins absent. 5/5 strength with hip flexion, knee extension, ankle dorsiflexion, ankle plantar flexion, and EHL. Sensation is intact over the lower extremity in L2-S1 nerve distributions. 2+ dorsalis pedis and posterior tibial pulses \par \par  [de-identified] : She is ambulating with no antalgia or Trendelenburg.  Incisions are well-healed.  She is neurologically intact.  No pain with gentle functional range of motion of either hip\par \par Palpation:\par No tenderness to palpation\par \par Range of Motion:		\par Right: HF: 90 IR: 10 ER: 20			\par Left: HF: 90 IR: 10 ER: 20\par \par Knee exam is normal bilaterally. No effusions. Good pain free full ROM bilaterally, both knees are stable to varus/valgus and ant/post stress\par \par  [de-identified] : 3 views of each hip are reviewed.  There is no evidence for any hardware complication loosening fracture or dislocation.  I do see some cortical hypertrophy that is symmetric surrounding the stem on the left.  No obvious periosteal reaction.

## 2023-05-15 NOTE — REVIEW OF SYSTEMS
[Arthralgia] : no arthralgia [Joint Pain] : no joint pain [Joint Stiffness] : no joint stiffness [Fever] : no fever [Chills] : no chills

## 2023-05-15 NOTE — HISTORY OF PRESENT ILLNESS
[de-identified] : PIPPA DELCID is a pleasant 53 year female. PIPPA DELCID is s/ bilateral hip replacements in 2018 with Dr. Gleason, left in April 2018 and right in October 2018. Overall, she's doing well from a hip replacement standpoint. She has no pain in the hips but does have some stiffness intermittently on the left. She does note some weight gain over the last few years and has some bilateral knee pain at times. She presents for hip replacements check.  Denies any fevers and chills.\par

## 2023-05-15 NOTE — DISCUSSION/SUMMARY
[de-identified] : 5 years S/P bilateral total hip replacement, progressing well clinically.  I did review the x-ray findings with her.  I do not see any obvious clinical sequelae to this but we should follow him closely.  I recommend she return with x-rays in 9 to 12 months or if she develops any significant symptoms including fevers chills night sweats nausea vomiting increasing pain or any other concern.  Symptomatic and pain relief, range of motion, activity advancement and precaution strategies reviewed, including use of over-the-counter medications as needed.  She understands the importance of weight loss for long-term survival of the hips as well as other health benefits and has begun to work on that we discussed appropriate exercises and activities for that as well.  We provided information regarding the need for antibiotic prophylaxis for future dental or invasive procedures. We will follow up as scheduled, but advised them to return sooner if they develops any concerns for an evaluation.

## 2023-07-25 NOTE — CONSULT NOTE ADULT - CONSULT REQUESTED DATE/TIME
How Severe Are Your Spot(S)?: moderate 07-Oct-2019 13:05 What Type Of Note Output Would You Prefer (Optional)?: Bullet Format What Is The Reason For Today's Visit?: Full Body Skin Examination What Is The Reason For Today's Visit? (Being Monitored For X): concerning skin lesions on an annual basis

## 2023-09-14 NOTE — DISCHARGE NOTE NURSING/CASE MANAGEMENT/SOCIAL WORK - NSTOBACCONEVERSMOKERY/N_GEN_A
History & Physical Reviewed:   Pregnant/Lactating:  ·  Are You Pregnant no   ·  Are You Currently Breastfeeding no     I have reviewed the History and Physical dated:  24-Mar-2023   History and Physical reviewed and relevant findings noted. Patient examined to review pertinent physical  findings.: No significant changes   Home Medications Reviewed: no changes noted   Allergies Reviewed: no changes noted       ERAS (Enhanced Recovery After Surgery):  ·  ERAS Patient: no     Consent:   COVID-19 Consent:  ·  COVID-19 Risk Consent Surgeon has reviewed key risks related to the risk of olesya COVID-19 and if they contract COVID-19 what the risks are.     Attestation:   Note Completion:  I am a:  Resident/Fellow   Attending Attestation I saw and evaluated the patient.  I personally obtained the key and critical portions of the history and physical exam or was physically present for key and  critical portions performed by the resident/fellow. I reviewed the resident/fellow?s documentation and discussed the patient with the resident/fellow.  I agree with the resident/fellow?s medical decision making as documented in the note.     I personally evaluated the patient on 24-Mar-2023         Electronic Signatures:  El Ibarra)  (Signed 24-Mar-2023 07:36)   Authored: Note Completion   Co-Signer: History & Physical Reviewed, ERAS, Consent, Note Completion  Domenica Clements (Fellow))  (Signed 24-Mar-2023 07:18)   Authored: History & Physical Reviewed, ERAS, Consent,  Note Completion      Last Updated: 24-Mar-2023 07:36 by El Ibarra)    No

## 2023-09-15 ASSESSMENT — HOOS JR
IMPORTED LATERALITY: LEFT
LYING IN BED (TURNING OVER, MAINTAINING HIP POSITION): MILD
RISING FROM SITTING: MODERATE
BENDING TO THE FLOOR TO PICK UP OBJECT: EXTREME
IMPORTED HOOS JR SCORE: 36.36
SITTING: MILD
GOING UP OR DOWN STAIRS: SEVERE
WALKING ON UNEVEN SURFACE: SEVERE
HOOS JR RAW SCORE: 17
IMPORTED HOOS JR SCORE: 92.34
HOOS JR RAW SCORE: 1
IMPORTED HOOS JR SCORE: 100.0
HOOS JR RAW SCORE: 0
IMPORTED FORM: YES
LYING IN BED (TURNING OVER, MAINTAINING HIP POSITION): EXTREME

## 2023-11-20 NOTE — PRE-OP CHECKLIST - BOWEL PREP
RX PROGRESS NOTE: Vancomycin Therapeutic Drug Monitoring      Indication for therapy: Non-necrotizing SSTI    ALLERGIES:   Allergen Reactions    Bee WEAKNESS     Body itches/rash, swelling     Adhesive   (Environmental) Dermatitis     Skin irritation       Most recent height and weight information:  Weight: 66.6 kg (11/20/23 0649)  Height: 5' 11\" (180.3 cm) (11/20/23 0649)    The Following are the calculated  Current Weights for Nirav Pickett       Adjusted Ideal    66.6 kg 75.3 kg               Labs:  Serum Creatinine and Creatinine Clearance:  Serum creatinine: 0.9 mg/dL 11/20/23 0305  Estimated creatinine clearance: 85.3 mL/min    Maximum Temperature (last 24 hours)       Value Max    Temp 98.1 °F (36.7 °C)          WBC (K/mcL)   Date/Time Value   11/20/2023 0305 11.7 (H)     Microbiology Results       None              Assessment/Plan:  Briefly, this is a 56 year old male started on vancomycin for Non-necrotizing SSTI, with a target serum trough concentration of 10-15 mcg/mL.  Patient received a dose of vancomycin 1500 mg at 0600  Initial dosing regimen will be 1000 mg every 12 hours (maintenance dose).    Pharmacy will monitor levels as appropriate.    Pharmacy will continue to monitor patient (renal function, microbiology data, risk factors for adverse events, appropriate duration of therapy), will order/monitor serum levels as appropriate, and will adjust dose if/when necessary.      Thank you,    Vadim Garcia, PHARMD  11/20/2023 7:29 AM       n/a

## 2024-02-02 ENCOUNTER — APPOINTMENT (OUTPATIENT)
Dept: ULTRASOUND IMAGING | Facility: HOSPITAL | Age: 54
End: 2024-02-02
Payer: MEDICAID

## 2024-02-02 ENCOUNTER — APPOINTMENT (OUTPATIENT)
Dept: MAMMOGRAPHY | Facility: HOSPITAL | Age: 54
End: 2024-02-02

## 2024-04-22 ENCOUNTER — OUTPATIENT (OUTPATIENT)
Dept: OUTPATIENT SERVICES | Facility: HOSPITAL | Age: 54
LOS: 1 days | End: 2024-04-22

## 2024-04-22 ENCOUNTER — APPOINTMENT (OUTPATIENT)
Dept: MAMMOGRAPHY | Facility: HOSPITAL | Age: 54
End: 2024-04-22

## 2024-04-22 ENCOUNTER — APPOINTMENT (OUTPATIENT)
Dept: ULTRASOUND IMAGING | Facility: HOSPITAL | Age: 54
End: 2024-04-22

## 2024-04-22 DIAGNOSIS — Z96.642 PRESENCE OF LEFT ARTIFICIAL HIP JOINT: Chronic | ICD-10-CM

## 2024-04-22 PROCEDURE — 77063 BREAST TOMOSYNTHESIS BI: CPT

## 2024-04-22 PROCEDURE — 76641 ULTRASOUND BREAST COMPLETE: CPT

## 2024-04-22 PROCEDURE — 77063 BREAST TOMOSYNTHESIS BI: CPT | Mod: 26

## 2024-04-22 PROCEDURE — 77067 SCR MAMMO BI INCL CAD: CPT | Mod: 26

## 2024-04-22 PROCEDURE — 77067 SCR MAMMO BI INCL CAD: CPT

## 2024-04-22 PROCEDURE — 76641 ULTRASOUND BREAST COMPLETE: CPT | Mod: 26,50

## 2025-07-09 ENCOUNTER — APPOINTMENT (OUTPATIENT)
Dept: ULTRASOUND IMAGING | Facility: CLINIC | Age: 55
End: 2025-07-09

## 2025-07-09 ENCOUNTER — APPOINTMENT (OUTPATIENT)
Dept: MAMMOGRAPHY | Facility: CLINIC | Age: 55
End: 2025-07-09
Payer: MEDICARE

## 2025-07-09 PROCEDURE — 76641 ULTRASOUND BREAST COMPLETE: CPT | Mod: 50

## 2025-07-09 PROCEDURE — 77063 BREAST TOMOSYNTHESIS BI: CPT

## 2025-07-09 PROCEDURE — 77067 SCR MAMMO BI INCL CAD: CPT

## 2025-08-27 ENCOUNTER — APPOINTMENT (OUTPATIENT)
Dept: ORTHOPEDIC SURGERY | Facility: CLINIC | Age: 55
End: 2025-08-27